# Patient Record
Sex: MALE | Race: BLACK OR AFRICAN AMERICAN | NOT HISPANIC OR LATINO | URBAN - METROPOLITAN AREA
[De-identification: names, ages, dates, MRNs, and addresses within clinical notes are randomized per-mention and may not be internally consistent; named-entity substitution may affect disease eponyms.]

---

## 2018-10-24 ENCOUNTER — INPATIENT (INPATIENT)
Facility: HOSPITAL | Age: 58
LOS: 6 days | Discharge: HOME | End: 2018-10-31
Attending: INTERNAL MEDICINE | Admitting: INTERNAL MEDICINE
Payer: COMMERCIAL

## 2018-10-24 VITALS
HEART RATE: 99 BPM | HEIGHT: 68 IN | WEIGHT: 158.95 LBS | OXYGEN SATURATION: 100 % | RESPIRATION RATE: 17 BRPM | TEMPERATURE: 98 F | SYSTOLIC BLOOD PRESSURE: 112 MMHG | DIASTOLIC BLOOD PRESSURE: 73 MMHG

## 2018-10-24 PROCEDURE — 99285 EMERGENCY DEPT VISIT HI MDM: CPT | Mod: 25

## 2018-10-24 NOTE — ED ADULT TRIAGE NOTE - CHIEF COMPLAINT QUOTE
pt fall yesterday c/o leg pain , bite her mouth, pt body shake denies drinking BIBA as per EMS ,pt fall yesterday c/o leg pain , bite her mouth, pt body shake denies drinking

## 2018-10-24 NOTE — ED ADULT NURSE NOTE - OBJECTIVE STATEMENT
Pt states, "I have been feeling weak, I cannot walk unless I have a cane.  I fell two times."  As per daughter, " My father has been off.  he doesn't even know what day it is sometimes  He has been unable to hold urine or bowel movement. This has been going on for a week and a half."

## 2018-10-25 DIAGNOSIS — R68.89 OTHER GENERAL SYMPTOMS AND SIGNS: ICD-10-CM

## 2018-10-25 DIAGNOSIS — R26.81 UNSTEADINESS ON FEET: ICD-10-CM

## 2018-10-25 LAB
ALBUMIN SERPL ELPH-MCNC: 3.4 G/DL — SIGNIFICANT CHANGE UP (ref 3.3–5)
ALP SERPL-CCNC: 58 U/L — SIGNIFICANT CHANGE UP (ref 40–120)
ALT FLD-CCNC: 50 U/L — SIGNIFICANT CHANGE UP (ref 12–78)
ANION GAP SERPL CALC-SCNC: 8 MMOL/L — SIGNIFICANT CHANGE UP (ref 5–17)
ANION GAP SERPL CALC-SCNC: 9 MMOL/L — SIGNIFICANT CHANGE UP (ref 5–17)
APPEARANCE UR: CLEAR — SIGNIFICANT CHANGE UP
APTT BLD: 31.3 SEC — SIGNIFICANT CHANGE UP (ref 27.5–37.4)
AST SERPL-CCNC: 32 U/L — SIGNIFICANT CHANGE UP (ref 15–37)
BASOPHILS # BLD AUTO: 0.03 K/UL — SIGNIFICANT CHANGE UP (ref 0–0.2)
BASOPHILS NFR BLD AUTO: 0.5 % — SIGNIFICANT CHANGE UP (ref 0–2)
BILIRUB SERPL-MCNC: 0.6 MG/DL — SIGNIFICANT CHANGE UP (ref 0.2–1.2)
BILIRUB UR-MCNC: NEGATIVE — SIGNIFICANT CHANGE UP
BUN SERPL-MCNC: 7 MG/DL — SIGNIFICANT CHANGE UP (ref 7–23)
BUN SERPL-MCNC: 8 MG/DL — SIGNIFICANT CHANGE UP (ref 7–23)
CALCIUM SERPL-MCNC: 8.8 MG/DL — SIGNIFICANT CHANGE UP (ref 8.5–10.1)
CALCIUM SERPL-MCNC: 8.9 MG/DL — SIGNIFICANT CHANGE UP (ref 8.5–10.1)
CHLORIDE SERPL-SCNC: 108 MMOL/L — SIGNIFICANT CHANGE UP (ref 96–108)
CHLORIDE SERPL-SCNC: 108 MMOL/L — SIGNIFICANT CHANGE UP (ref 96–108)
CHOLEST SERPL-MCNC: 144 MG/DL — SIGNIFICANT CHANGE UP (ref 10–199)
CK SERPL-CCNC: 120 U/L — SIGNIFICANT CHANGE UP (ref 26–308)
CO2 SERPL-SCNC: 25 MMOL/L — SIGNIFICANT CHANGE UP (ref 22–31)
CO2 SERPL-SCNC: 25 MMOL/L — SIGNIFICANT CHANGE UP (ref 22–31)
COLOR SPEC: YELLOW — SIGNIFICANT CHANGE UP
CREAT SERPL-MCNC: 0.88 MG/DL — SIGNIFICANT CHANGE UP (ref 0.5–1.3)
CREAT SERPL-MCNC: 0.9 MG/DL — SIGNIFICANT CHANGE UP (ref 0.5–1.3)
DIFF PNL FLD: NEGATIVE — SIGNIFICANT CHANGE UP
EOSINOPHIL # BLD AUTO: 0.1 K/UL — SIGNIFICANT CHANGE UP (ref 0–0.5)
EOSINOPHIL NFR BLD AUTO: 1.6 % — SIGNIFICANT CHANGE UP (ref 0–6)
ERYTHROCYTE [SEDIMENTATION RATE] IN BLOOD: 38 MM/HR — HIGH (ref 0–20)
GLUCOSE BLDC GLUCOMTR-MCNC: 99 MG/DL — SIGNIFICANT CHANGE UP (ref 70–99)
GLUCOSE SERPL-MCNC: 105 MG/DL — HIGH (ref 70–99)
GLUCOSE SERPL-MCNC: 90 MG/DL — SIGNIFICANT CHANGE UP (ref 70–99)
GLUCOSE UR QL: NEGATIVE MG/DL — SIGNIFICANT CHANGE UP
HAV IGM SER-ACNC: SIGNIFICANT CHANGE UP
HBA1C BLD-MCNC: 5.8 % — HIGH (ref 4–5.6)
HBV CORE IGM SER-ACNC: SIGNIFICANT CHANGE UP
HBV SURFACE AG SER-ACNC: SIGNIFICANT CHANGE UP
HCT VFR BLD CALC: 35.9 % — LOW (ref 39–50)
HCT VFR BLD CALC: 40.6 % — SIGNIFICANT CHANGE UP (ref 39–50)
HCV AB S/CO SERPL IA: 0.24 S/CO — SIGNIFICANT CHANGE UP
HCV AB SERPL-IMP: SIGNIFICANT CHANGE UP
HDLC SERPL-MCNC: 31 MG/DL — LOW
HGB BLD-MCNC: 12.8 G/DL — LOW (ref 13–17)
HGB BLD-MCNC: 14.2 G/DL — SIGNIFICANT CHANGE UP (ref 13–17)
IMM GRANULOCYTES NFR BLD AUTO: 0.6 % — SIGNIFICANT CHANGE UP (ref 0–1.5)
INR BLD: 1.09 RATIO — SIGNIFICANT CHANGE UP (ref 0.88–1.16)
KETONES UR-MCNC: NEGATIVE — SIGNIFICANT CHANGE UP
LACTATE SERPL-SCNC: 1.7 MMOL/L — SIGNIFICANT CHANGE UP (ref 0.7–2)
LEUKOCYTE ESTERASE UR-ACNC: NEGATIVE — SIGNIFICANT CHANGE UP
LIPID PNL WITH DIRECT LDL SERPL: 96 MG/DL — SIGNIFICANT CHANGE UP
LYMPHOCYTES # BLD AUTO: 3.69 K/UL — HIGH (ref 1–3.3)
LYMPHOCYTES # BLD AUTO: 59.6 % — HIGH (ref 13–44)
MCHC RBC-ENTMCNC: 29.6 PG — SIGNIFICANT CHANGE UP (ref 27–34)
MCHC RBC-ENTMCNC: 30.3 PG — SIGNIFICANT CHANGE UP (ref 27–34)
MCHC RBC-ENTMCNC: 35 GM/DL — SIGNIFICANT CHANGE UP (ref 32–36)
MCHC RBC-ENTMCNC: 35.7 GM/DL — SIGNIFICANT CHANGE UP (ref 32–36)
MCV RBC AUTO: 84.6 FL — SIGNIFICANT CHANGE UP (ref 80–100)
MCV RBC AUTO: 85.1 FL — SIGNIFICANT CHANGE UP (ref 80–100)
MONOCYTES # BLD AUTO: 0.57 K/UL — SIGNIFICANT CHANGE UP (ref 0–0.9)
MONOCYTES NFR BLD AUTO: 9.2 % — SIGNIFICANT CHANGE UP (ref 2–14)
NEUTROPHILS # BLD AUTO: 1.76 K/UL — LOW (ref 1.8–7.4)
NEUTROPHILS NFR BLD AUTO: 28.5 % — LOW (ref 43–77)
NITRITE UR-MCNC: NEGATIVE — SIGNIFICANT CHANGE UP
NRBC # BLD: 0 /100 WBCS — SIGNIFICANT CHANGE UP (ref 0–0)
NRBC # BLD: 0 /100 WBCS — SIGNIFICANT CHANGE UP (ref 0–0)
PH UR: 6 — SIGNIFICANT CHANGE UP (ref 5–8)
PLATELET # BLD AUTO: 69 K/UL — LOW (ref 150–400)
PLATELET # BLD AUTO: 75 K/UL — LOW (ref 150–400)
POTASSIUM SERPL-MCNC: 3.8 MMOL/L — SIGNIFICANT CHANGE UP (ref 3.5–5.3)
POTASSIUM SERPL-MCNC: 4.1 MMOL/L — SIGNIFICANT CHANGE UP (ref 3.5–5.3)
POTASSIUM SERPL-SCNC: 3.8 MMOL/L — SIGNIFICANT CHANGE UP (ref 3.5–5.3)
POTASSIUM SERPL-SCNC: 4.1 MMOL/L — SIGNIFICANT CHANGE UP (ref 3.5–5.3)
PROT SERPL-MCNC: 8.7 GM/DL — HIGH (ref 6–8.3)
PROT UR-MCNC: NEGATIVE MG/DL — SIGNIFICANT CHANGE UP
PROTHROM AB SERPL-ACNC: 11.9 SEC — SIGNIFICANT CHANGE UP (ref 9.8–12.7)
RBC # BLD: 4.22 M/UL — SIGNIFICANT CHANGE UP (ref 4.2–5.8)
RBC # BLD: 4.8 M/UL — SIGNIFICANT CHANGE UP (ref 4.2–5.8)
RBC # FLD: 14.1 % — SIGNIFICANT CHANGE UP (ref 10.3–14.5)
RBC # FLD: 14.2 % — SIGNIFICANT CHANGE UP (ref 10.3–14.5)
SODIUM SERPL-SCNC: 141 MMOL/L — SIGNIFICANT CHANGE UP (ref 135–145)
SODIUM SERPL-SCNC: 142 MMOL/L — SIGNIFICANT CHANGE UP (ref 135–145)
SP GR SPEC: 1.01 — SIGNIFICANT CHANGE UP (ref 1.01–1.02)
T PALLIDUM AB TITR SER: NEGATIVE — SIGNIFICANT CHANGE UP
TOTAL CHOLESTEROL/HDL RATIO MEASUREMENT: 4.6 RATIO — SIGNIFICANT CHANGE UP (ref 3.4–9.6)
TRIGL SERPL-MCNC: 87 MG/DL — SIGNIFICANT CHANGE UP (ref 10–149)
TROPONIN I SERPL-MCNC: <.015 NG/ML — SIGNIFICANT CHANGE UP (ref 0.01–0.04)
TSH SERPL-MCNC: 0.66 UIU/ML — SIGNIFICANT CHANGE UP (ref 0.36–3.74)
UROBILINOGEN FLD QL: NEGATIVE MG/DL — SIGNIFICANT CHANGE UP
VIT B12 SERPL-MCNC: 828 PG/ML — SIGNIFICANT CHANGE UP (ref 232–1245)
WBC # BLD: 5.47 K/UL — SIGNIFICANT CHANGE UP (ref 3.8–10.5)
WBC # BLD: 6.19 K/UL — SIGNIFICANT CHANGE UP (ref 3.8–10.5)
WBC # FLD AUTO: 5.47 K/UL — SIGNIFICANT CHANGE UP (ref 3.8–10.5)
WBC # FLD AUTO: 6.19 K/UL — SIGNIFICANT CHANGE UP (ref 3.8–10.5)

## 2018-10-25 PROCEDURE — 99223 1ST HOSP IP/OBS HIGH 75: CPT

## 2018-10-25 PROCEDURE — 70450 CT HEAD/BRAIN W/O DYE: CPT | Mod: 26

## 2018-10-25 PROCEDURE — 70551 MRI BRAIN STEM W/O DYE: CPT | Mod: 26

## 2018-10-25 PROCEDURE — 72148 MRI LUMBAR SPINE W/O DYE: CPT | Mod: 26

## 2018-10-25 PROCEDURE — 71046 X-RAY EXAM CHEST 2 VIEWS: CPT | Mod: 26

## 2018-10-25 PROCEDURE — 93010 ELECTROCARDIOGRAM REPORT: CPT

## 2018-10-25 PROCEDURE — 12345: CPT | Mod: NC

## 2018-10-25 PROCEDURE — 72141 MRI NECK SPINE W/O DYE: CPT | Mod: 26

## 2018-10-25 RX ORDER — INFLUENZA VIRUS VACCINE 15; 15; 15; 15 UG/.5ML; UG/.5ML; UG/.5ML; UG/.5ML
0.5 SUSPENSION INTRAMUSCULAR ONCE
Qty: 0 | Refills: 0 | Status: DISCONTINUED | OUTPATIENT
Start: 2018-10-25 | End: 2018-10-31

## 2018-10-25 RX ORDER — SODIUM CHLORIDE 9 MG/ML
1000 INJECTION INTRAMUSCULAR; INTRAVENOUS; SUBCUTANEOUS ONCE
Qty: 0 | Refills: 0 | Status: COMPLETED | OUTPATIENT
Start: 2018-10-25 | End: 2018-10-25

## 2018-10-25 RX ADMIN — SODIUM CHLORIDE 1000 MILLILITER(S): 9 INJECTION INTRAMUSCULAR; INTRAVENOUS; SUBCUTANEOUS at 02:15

## 2018-10-25 NOTE — H&P ADULT - HISTORY OF PRESENT ILLNESS
59 y/o male no pmh BIB daughter for intermittent confusion and unsteady gait since falling 3 weeks ago. Pt states he tends to fall to right side; he denies muscle weakness,  but does complain of numbness of RLE. He denies headache, neck pain, dizziness or vertigo,  change of urinary or bowel habits, abd pain, N/V, rectal bleeding, melena, rash.  No fever/chills, No photophobia/eye pain/changes in vision, No ear pain/sore throat/dysphagia, No chest pain/palpitations, no SOB/cough/wheeze/stridor, No abdominal pain, No N/V/D, no dysuria/frequency/discharge, No neck/back pain. He takes no medications and drinks 1-2 beers/week. He is poor historian.

## 2018-10-25 NOTE — ED PROVIDER NOTE - MEDICAL DECISION MAKING DETAILS
labs and imaging reviewed. patient received ivfs. he is now admitted to medicine for further management.

## 2018-10-25 NOTE — H&P ADULT - NSHPPHYSICALEXAM_GEN_ALL_CORE
T(C): 36.7 (24 Oct 2018 22:52), Max: 36.7 (24 Oct 2018 22:52)  T(F): 98 (24 Oct 2018 22:52), Max: 98 (24 Oct 2018 22:52)  HR: 99 (24 Oct 2018 22:52) (99 - 99)  BP: 112/73 (24 Oct 2018 22:52) (112/73 - 112/73)  BP(mean): --  RR: 17 (24 Oct 2018 22:52) (17 - 17)  SpO2: 100% (24 Oct 2018 22:52) (100% - 100%)    PHYSICAL EXAM:  GENERAL: NAD, well-groomed, well-developed  HEAD:  Atraumatic, Normocephalic  EYES: EOMI, PERRLA, conjunctiva and sclera clear  ENMT: No tonsillar erythema, exudates, or enlargement; Moist mucous membranes, No lesions  NECK: Supple, No JVD, Normal thyroid  NERVOUS SYSTEM:  Alert & Oriented X2, CN 2-12 intact; Motor Strength 5/5 B/L upper and lower extremities; decreased sensation light touch RLE, bilateral coarse tremor, F-nose intact   CHEST/LUNG: Clear to percussion bilaterally; No rales, rhonchi, wheezing, or rubs  HEART: Regular rate and rhythm; No murmurs, rubs, or gallops  ABDOMEN: Soft, Nontender, Nondistended; Bowel sounds present  EXTREMITIES: + Peripheral Pulses, No clubbing, cyanosis, or edema  LYMPH: No lymphadenopathy noted  SKIN: Warm and dry; No rashes or lesions

## 2018-10-25 NOTE — CHART NOTE - NSCHARTNOTEFT_GEN_A_CORE
Patient is a 58y old  Male who presents with a chief complaint of Unsteady gait and falls. (25 Oct 2018 06:17)        HPI:  59 y/o male no pmh BIB daughter for intermittent confusion and unsteady gait since falling 3 weeks ago. Pt states he tends to fall to right side; he denies muscle weakness,  but does complain of numbness of RLE. He denies headache, neck pain, dizziness or vertigo,  change of urinary or bowel habits, abd pain, N/V, rectal bleeding, melena, rash.  No fever/chills, No photophobia/eye pain/changes in vision, No ear pain/sore throat/dysphagia, No chest pain/palpitations, no SOB/cough/wheeze/stridor, No abdominal pain, No N/V/D, no dysuria/frequency/discharge, No neck/back pain. He takes no medications and drinks 1-2 beers/week. He is poor historian. (25 Oct 2018 06:17)      SUBJECTIVE & OBJECTIVE: Pt seen and examined at bedside. States that he feels weak and tired, decrease sensation to the upper and lower right extremity     PHYSICAL EXAM:  T(C): 36.7 (10-25-18 @ 12:33), Max: 36.9 (10-25-18 @ 10:41)  HR: 80 (10-25-18 @ 12:33) (72 - 99)  BP: 121/73 (10-25-18 @ 12:33) (112/73 - 134/86)  RR: 18 (10-25-18 @ 12:33) (16 - 18)  SpO2: 99% (10-25-18 @ 12:33) (97% - 100%)  Wt(kg): -- Height (cm): 172.72 (10-24 @ 22:52)  Weight (kg): 72.1 (10-24 @ 22:52)  BMI (kg/m2): 24.2 (10-24 @ 22:52)  BSA (m2): 1.85 (10-24 @ 22:52)  I&O's Detail    GENERAL: NAD, well-groomed, well-developed  HEAD:  Atraumatic, Normocephalic  EYES: EOMI, PERRLA, conjunctiva and sclera clear  ENMT: Moist mucous membranes  NECK: Supple, No JVD  NERVOUS SYSTEM:  right upper extremity tremor, decreased sensation to right upper and lower extremity  CHEST/LUNG: Clear to auscultation bilaterally; No rales, rhonchi, wheezing, or rubs  HEART: Regular rate and rhythm; No murmurs, rubs, or gallops  ABDOMEN: Soft, Nontender, Nondistended; Bowel sounds present  EXTREMITIES:  2+ Peripheral Pulses, No clubbing, cyanosis, or edema    MEDICATIONS  (STANDING):  influenza   Vaccine 0.5 milliLiter(s) IntraMuscular once    MEDICATIONS  (PRN):      LABS:                        14.2   6.19  )-----------( 75       ( 25 Oct 2018 13:42 )             40.6     10-25    142  |  108  |  7   ----------------------------<  90  3.8   |  25  |  0.88    Ca    8.8      25 Oct 2018 07:05    TPro  8.7<H>  /  Alb  3.4  /  TBili  0.6  /  DBili  x   /  AST  32  /  ALT  50  /  AlkPhos  58  10    PT/INR - ( 25 Oct 2018 04:33 )   PT: 11.9 sec;   INR: 1.09 ratio         PTT - ( 25 Oct 2018 04:33 )  PTT:31.3 sec  Urinalysis Basic - ( 25 Oct 2018 05:01 )    Color: Yellow / Appearance: Clear / S.010 / pH: x  Gluc: x / Ketone: Negative  / Bili: Negative / Urobili: Negative mg/dL   Blood: x / Protein: Negative mg/dL / Nitrite: Negative   Leuk Esterase: Negative / RBC: x / WBC x   Sq Epi: x / Non Sq Epi: x / Bacteria: x      CAPILLARY BLOOD GLUCOSE  POCT Blood Glucose.: 99 mg/dL (25 Oct 2018 04:57)      CARDIAC MARKERS ( 25 Oct 2018 04:33 )  <.015 ng/mL / x     / 120 U/L / x     / x        RADIOLOGY & ADDITIONAL TESTS:  < from: MR Head No Cont (10.25.18 @ 10:05) >    1)  chronic ischemic changes within the basal ganglia and white matter of   both hemispheres. No acute infarct or hemorrhage..  2)  extensive pansinus disease with findings suggesting chronic sinusitis   and sinonasal polyposis..     < end of copied text >   from: MR Lumbar Spine No Cont (10.25.18 @ 10:05) >    1. Mild degenerative changes and bulging discs L3-S1.  2. No significant stenosis or intradural abnormality.  3. No acute fracture or destructive lesion.    < end of copied text >    DVT/GI ppx  Discussed with pt @ bedside    59 y/o male with unsteady gait and forgetfulness  Problem/Plan - 1:  ·  Problem: Unsteady gait.    Plan: MR essential negative for acute infarct ,   following B12, rpr,   pending neurology consult.     Problem/Plan - 2:  ·  Problem: Forgetfulness.    Plan: MR, B12, rpr, ? early dementia.

## 2018-10-25 NOTE — H&P ADULT - ASSESSMENT
57 y/o male no pmh BIB daughter for intermittent confusion and unsteady gait since falling 3 weeks ago. Pt has thrombocytopenia, CT head shows no SDH, no evidence hydrocephalus, doubt NPH. Will check RPR and B12, he has tremor, but no increased muscle tonus, and Parkinson's would not progress this quickly. His MCV is not elevated, and LFT's are normal range.  IMPROVE VTE Individual Risk Assessment          RISK                                                          Points    [  ] Previous VTE                                                3    [  ] Thrombophilia                                             2    [  ] Lower limb paralysis                                    2        (unable to hold up >15 seconds)      [  ] Current Cancer                                             2         (within 6 months)    [  ] Immobilization > 24 hrs                              1    [  ] ICU/CCU stay > 24 hours                            1    [  ] Age > 60                                                    1    IMPROVE VTE Score _____0____

## 2018-10-25 NOTE — H&P ADULT - NSHPLABSRESULTS_GEN_ALL_CORE
LABS:                        12.8   5.47  )-----------( 69       ( 25 Oct 2018 04:33 )             35.9     10-25    141  |  108  |  8   ----------------------------<  105<H>  4.1   |  25  |  0.90    Ca    8.9      25 Oct 2018 04:33    TPro  8.7<H>  /  Alb  3.4  /  TBili  0.6  /  DBili  x   /  AST  32  /  ALT  50  /  AlkPhos  58  10-    PT/INR - ( 25 Oct 2018 04:33 )   PT: 11.9 sec;   INR: 1.09 ratio         PTT - ( 25 Oct 2018 04:33 )  PTT:31.3 sec  Troponin I, Serum: <.015:  ng/mL (10.25.18 @ 04:33)  Thyroid Stimulating Hormone, Serum: 0.658 uIU/mL (10.25.18 @ 04:33)      Urinalysis Basic - ( 25 Oct 2018 05:01 )    Color: Yellow / Appearance: Clear / S.010 / pH: x  Gluc: x / Ketone: Negative  / Bili: Negative / Urobili: Negative mg/dL   Blood: x / Protein: Negative mg/dL / Nitrite: Negative   Leuk Esterase: Negative / RBC: x / WBC x   Sq Epi: x / Non Sq Epi: x / Bacteria: x      CAPILLARY BLOOD GLUCOSE      POCT Blood Glucose.: 99 mg/dL (25 Oct 2018 04:57)        RADIOLOGY & ADDITIONAL TESTS:  < from: CT Head No Cont (10.25.18 @ 01:08) >    IMPRESSION:  No acute intracranial hemorrhage or mass effect. Mild chronic   microvascular changes.    CXR: no infiltrate    Imaging Personally Reviewed:  [x] YES  [ ] NO  EKG: sinus@ 86 no acute ischemic changes

## 2018-10-25 NOTE — H&P ADULT - NSHPREVIEWOFSYSTEMS_GEN_ALL_CORE
REVIEW OF SYSTEMS:  CONSTITUTIONAL: No fever, weight loss, or fatigue  EYES: No eye pain, visual disturbances, or discharge  ENMT:  No difficulty hearing, tinnitus, vertigo; No sinus or throat pain  NECK: No pain or stiffness  RESPIRATORY: No cough, wheezing, chills or hemoptysis; No shortness of breath  CARDIOVASCULAR: No chest pain, palpitations, dizziness, or leg swelling  GASTROINTESTINAL: No abdominal or epigastric pain. No nausea, vomiting, or hematemesis; No diarrhea or constipation. No melena or hematochezia.  GENITOURINARY: No dysuria, frequency, hematuria, or incontinence  NEUROLOGICAL: No headaches, memory loss, loss of strength, +numbness, + tremors  SKIN: No itching, burning, rashes, or lesions   LYMPH NODES: No enlarged glands  ENDOCRINE: No heat or cold intolerance; No hair loss  MUSCULOSKELETAL: No joint pain or swelling; No muscle, back, or extremity pain  PSYCHIATRIC: No depression, anxiety, mood swings, or difficulty sleeping  HEME/LYMPH: No easy bruising, or bleeding gums  ALLERGY AND IMMUNOLOGIC: No hives or eczema

## 2018-10-26 DIAGNOSIS — R29.898 OTHER SYMPTOMS AND SIGNS INVOLVING THE MUSCULOSKELETAL SYSTEM: ICD-10-CM

## 2018-10-26 DIAGNOSIS — R25.9 UNSPECIFIED ABNORMAL INVOLUNTARY MOVEMENTS: ICD-10-CM

## 2018-10-26 LAB
ALBUMIN SERPL ELPH-MCNC: 3.4 G/DL — SIGNIFICANT CHANGE UP (ref 3.3–5)
ALP SERPL-CCNC: 53 U/L — SIGNIFICANT CHANGE UP (ref 40–120)
ALT FLD-CCNC: 48 U/L — SIGNIFICANT CHANGE UP (ref 12–78)
ANION GAP SERPL CALC-SCNC: 8 MMOL/L — SIGNIFICANT CHANGE UP (ref 5–17)
AST SERPL-CCNC: 34 U/L — SIGNIFICANT CHANGE UP (ref 15–37)
BASOPHILS # BLD AUTO: 0.03 K/UL — SIGNIFICANT CHANGE UP (ref 0–0.2)
BASOPHILS NFR BLD AUTO: 0.6 % — SIGNIFICANT CHANGE UP (ref 0–2)
BILIRUB SERPL-MCNC: 0.9 MG/DL — SIGNIFICANT CHANGE UP (ref 0.2–1.2)
BUN SERPL-MCNC: 10 MG/DL — SIGNIFICANT CHANGE UP (ref 7–23)
CALCIUM SERPL-MCNC: 9.3 MG/DL — SIGNIFICANT CHANGE UP (ref 8.5–10.1)
CHLORIDE SERPL-SCNC: 107 MMOL/L — SIGNIFICANT CHANGE UP (ref 96–108)
CO2 SERPL-SCNC: 26 MMOL/L — SIGNIFICANT CHANGE UP (ref 22–31)
CREAT SERPL-MCNC: 1.01 MG/DL — SIGNIFICANT CHANGE UP (ref 0.5–1.3)
CULTURE RESULTS: SIGNIFICANT CHANGE UP
EOSINOPHIL # BLD AUTO: 0.11 K/UL — SIGNIFICANT CHANGE UP (ref 0–0.5)
EOSINOPHIL NFR BLD AUTO: 2.1 % — SIGNIFICANT CHANGE UP (ref 0–6)
GLUCOSE SERPL-MCNC: 87 MG/DL — SIGNIFICANT CHANGE UP (ref 70–99)
HCT VFR BLD CALC: 37.1 % — LOW (ref 39–50)
HGB BLD-MCNC: 13.2 G/DL — SIGNIFICANT CHANGE UP (ref 13–17)
IMM GRANULOCYTES NFR BLD AUTO: 0.6 % — SIGNIFICANT CHANGE UP (ref 0–1.5)
LYMPHOCYTES # BLD AUTO: 3.34 K/UL — HIGH (ref 1–3.3)
LYMPHOCYTES # BLD AUTO: 62.3 % — HIGH (ref 13–44)
MCHC RBC-ENTMCNC: 29.9 PG — SIGNIFICANT CHANGE UP (ref 27–34)
MCHC RBC-ENTMCNC: 35.6 GM/DL — SIGNIFICANT CHANGE UP (ref 32–36)
MCV RBC AUTO: 84.1 FL — SIGNIFICANT CHANGE UP (ref 80–100)
MONOCYTES # BLD AUTO: 0.62 K/UL — SIGNIFICANT CHANGE UP (ref 0–0.9)
MONOCYTES NFR BLD AUTO: 11.6 % — SIGNIFICANT CHANGE UP (ref 2–14)
NEUTROPHILS # BLD AUTO: 1.23 K/UL — LOW (ref 1.8–7.4)
NEUTROPHILS NFR BLD AUTO: 22.8 % — LOW (ref 43–77)
PLATELET # BLD AUTO: 73 K/UL — LOW (ref 150–400)
POTASSIUM SERPL-MCNC: 3.7 MMOL/L — SIGNIFICANT CHANGE UP (ref 3.5–5.3)
POTASSIUM SERPL-SCNC: 3.7 MMOL/L — SIGNIFICANT CHANGE UP (ref 3.5–5.3)
PROT SERPL-MCNC: 9 GM/DL — HIGH (ref 6–8.3)
RBC # BLD: 4.41 M/UL — SIGNIFICANT CHANGE UP (ref 4.2–5.8)
RBC # FLD: 13.9 % — SIGNIFICANT CHANGE UP (ref 10.3–14.5)
SODIUM SERPL-SCNC: 141 MMOL/L — SIGNIFICANT CHANGE UP (ref 135–145)
SPECIMEN SOURCE: SIGNIFICANT CHANGE UP
WBC # BLD: 5.36 K/UL — SIGNIFICANT CHANGE UP (ref 3.8–10.5)
WBC # FLD AUTO: 5.36 K/UL — SIGNIFICANT CHANGE UP (ref 3.8–10.5)

## 2018-10-26 PROCEDURE — 99233 SBSQ HOSP IP/OBS HIGH 50: CPT

## 2018-10-26 PROCEDURE — 93306 TTE W/DOPPLER COMPLETE: CPT | Mod: 26

## 2018-10-26 RX ORDER — LORATADINE 10 MG/1
10 TABLET ORAL DAILY
Qty: 0 | Refills: 0 | Status: DISCONTINUED | OUTPATIENT
Start: 2018-10-26 | End: 2018-10-31

## 2018-10-26 RX ORDER — ASPIRIN/CALCIUM CARB/MAGNESIUM 324 MG
81 TABLET ORAL DAILY
Qty: 0 | Refills: 0 | Status: DISCONTINUED | OUTPATIENT
Start: 2018-10-26 | End: 2018-10-31

## 2018-10-26 RX ADMIN — Medication 81 MILLIGRAM(S): at 12:40

## 2018-10-26 RX ADMIN — LORATADINE 10 MILLIGRAM(S): 10 TABLET ORAL at 15:31

## 2018-10-26 NOTE — PROGRESS NOTE ADULT - PROBLEM SELECTOR PLAN 1
- MR head demonstrates chronic ischemic changes  - ? Cardiac history   - TSH wnl, B12 wnl, following lyme titers  - Neurology consult pending  - would obtain TTE  - Patient would benefit from ASA 81 mg daily, will start

## 2018-10-26 NOTE — PHYSICAL THERAPY INITIAL EVALUATION ADULT - ADDITIONAL COMMENTS
Pt lives in an apartment. Pt is on 1st floor with no stairs. Pt ambulates with standard cane. Pt does not work and is on disability

## 2018-10-26 NOTE — PROGRESS NOTE ADULT - SUBJECTIVE AND OBJECTIVE BOX
Patient is a 58y old  Male who presents with a chief complaint of Unsteady gait and falls. (25 Oct 2018 06:17)        HPI:  59 y/o male no pmh BIB daughter for intermittent confusion and unsteady gait since falling 3 weeks ago. Pt states he tends to fall to right side; he denies muscle weakness,  but does complain of numbness of RLE. He denies headache, neck pain, dizziness or vertigo,  change of urinary or bowel habits, abd pain, N/V, rectal bleeding, melena, rash.  No fever/chills, No photophobia/eye pain/changes in vision, No ear pain/sore throat/dysphagia, No chest pain/palpitations, no SOB/cough/wheeze/stridor, No abdominal pain, No N/V/D, no dysuria/frequency/discharge, No neck/back pain. He takes no medications and drinks 1-2 beers/week. He is poor historian. (25 Oct 2018 06:17)      SUBJECTIVE & OBJECTIVE: Pt seen and examined at bedside. States that he continues to feel weak but that his appetite is better.  Denies SOB, chest pain, headache, or other symptom.     PHYSICAL EXAM:  T(C): 36.9 (10-26-18 @ 05:15), Max: 36.9 (10-25-18 @ 10:41)  HR: 96 (10-26-18 @ 05:15) (72 - 96)  BP: 113/72 (10-26-18 @ 05:15) (108/70 - 123/75)  RR: 16 (10-26-18 @ 05:15) (16 - 18)  SpO2: 98% (10-26-18 @ 05:15) (98% - 99%)  Wt(kg): --   I&O's Detail    GENERAL: NAD, well-groomed, well-developed  HEAD:  Atraumatic, Normocephalic  EYES: EOMI, PERRLA, conjunctiva and sclera clear  ENMT: Moist mucous membranes  NECK: Supple, No JVD  NERVOUS SYSTEM:  Alert & Oriented X3, Motor Strength 5/5 B/L upper and lower extremities; DTRs 2+ intact and symmetric  CHEST/LUNG: Clear to auscultation bilaterally; No rales, rhonchi, wheezing, or rubs  HEART: Regular rate and rhythm; No murmurs, rubs, or gallops  ABDOMEN: Soft, Nontender, Nondistended; Bowel sounds present  EXTREMITIES:  2+ Peripheral Pulses, No clubbing, cyanosis, or edema    MEDICATIONS  (STANDING):  influenza   Vaccine 0.5 milliLiter(s) IntraMuscular once  loratadine 10 milliGRAM(s) Oral daily    MEDICATIONS  (PRN):      LABS:                        13.2   5.36  )-----------( 73       ( 26 Oct 2018 07:44 )             37.1     10-26    141  |  107  |  10  ----------------------------<  87  3.7   |  26  |  1.01    Ca    9.3      26 Oct 2018 07:44  TPro  9.0<H>  /  Alb  3.4  /  TBili  0.9  /  DBili  x   /  AST  34  /  ALT  48  /  AlkPhos  53  10-26  PT/INR - ( 25 Oct 2018 04:33 )   PT: 11.9 sec;   INR: 1.09 ratio    PTT - ( 25 Oct 2018 04:33 )  PTT:31.3 sec  Urinalysis Basic - ( 25 Oct 2018 05:01 )  Color: Yellow / Appearance: Clear / S.010 / pH: x  Gluc: x / Ketone: Negative  / Bili: Negative / Urobili: Negative mg/dL   Blood: x / Protein: Negative mg/dL / Nitrite: Negative   Leuk Esterase: Negative / RBC: x / WBC x   Sq Epi: x / Non Sq Epi: x / Bacteria: x    CARDIAC MARKERS ( 25 Oct 2018 04:33 )  <.015 ng/mL / x     / 120 U/L / x     / x      RADIOLOGY & ADDITIONAL TESTS:  < from: MR Lumbar Spine No Cont (10.25.18 @ 10:05) >  1. Mild degenerative changes and bulging discs L3-S1.  2. No significant stenosis or intradural abnormality.  3. No acute fracture or destructive lesion.  < end of copied text >  < from: MR Head No Cont (10.25.18 @ 10:05) >  IMPRESSION:      1)  chronic ischemic changes within the basal ganglia and white matter of   both hemispheres. No acute infarct or hemorrhage..  2)  extensive pansinus disease with findings suggesting chronic sinusitis   and sinonasal polyposis..     < end of copied text >    DVT/GI ppx  Discussed with pt @ bedside

## 2018-10-26 NOTE — OCCUPATIONAL THERAPY INITIAL EVALUATION ADULT - PLANNED THERAPY INTERVENTIONS, OT EVAL
ADL retraining/IADL retraining/fine motor coordination training/motor coordination training/transfer training/bed mobility training/parent/caregiver training.../strengthening/stretching/balance training

## 2018-10-26 NOTE — OCCUPATIONAL THERAPY INITIAL EVALUATION ADULT - ADDITIONAL COMMENTS
Patient reports that he lives with spouse and daughter Yoshi in 1st floor apartment with no steps to enter and no steps to negotiate inside the building. Patient reports that he performs functional mobility with cane with supervision from family, requires assistance from family for lower body dressing, assistance from family for tub transfers with shower chair, assistance from family for bathing & assistance for meal preparation. Patient's daughter Yoshi Roberts was present for evaluation and confirmed all information about patient's social history and prior level of function.

## 2018-10-26 NOTE — OCCUPATIONAL THERAPY INITIAL EVALUATION ADULT - LEVEL OF INDEPENDENCE: DRESS LOWER BODY, OT EVAL
socks: right LE supervision, left LE moderate assistance/supervision/moderate assist (50% patients effort)

## 2018-10-27 PROCEDURE — 99233 SBSQ HOSP IP/OBS HIGH 50: CPT

## 2018-10-27 RX ADMIN — LORATADINE 10 MILLIGRAM(S): 10 TABLET ORAL at 14:48

## 2018-10-27 RX ADMIN — Medication 81 MILLIGRAM(S): at 12:52

## 2018-10-27 NOTE — PROGRESS NOTE ADULT - ASSESSMENT
57 y/o male with weakness, gait instability, and tremor    Problem/Plan - 1:  ·  Problem: R/O Vascular dementia without behavioral disturbance.  Plan: - MR head demonstrates chronic ischemic changes  - ? Cardiac history   - TSH wnl, B12 wnl, following lyme titers  - TTE wnl  - Patient would benefit from ASA 81 mg daily, will start.   - pending Neurology eval    Problem/Plan - 2:  ·  Problem: Weakness of both lower extremities.  Plan: - patient would benefit from PT and likely [facility]  - pending PT evaluation.     Problem/Plan - 3:  ·  Problem: Resting tremor.  Plan: - OT evaluation  - Neurology eval.  -.

## 2018-10-27 NOTE — PROGRESS NOTE ADULT - SUBJECTIVE AND OBJECTIVE BOX
Patient is a 58y old  Male who presents with a chief complaint of Unsteady gait and falls. (26 Oct 2018 10:09)        HPI:  57 y/o male no pmh BIB daughter for intermittent confusion and unsteady gait since falling 3 weeks ago. Pt states he tends to fall to right side; he denies muscle weakness,  but does complain of numbness of RLE. He denies headache, neck pain, dizziness or vertigo,  change of urinary or bowel habits, abd pain, N/V, rectal bleeding, melena, rash.  No fever/chills, No photophobia/eye pain/changes in vision, No ear pain/sore throat/dysphagia, No chest pain/palpitations, no SOB/cough/wheeze/stridor, No abdominal pain, No N/V/D, no dysuria/frequency/discharge, No neck/back pain. He takes no medications and drinks 1-2 beers/week. He is poor historian. (25 Oct 2018 06:17)      SUBJECTIVE & OBJECTIVE: Pt seen and examined at bedside.     PHYSICAL EXAM:  T(C): 36.7 (10-27-18 @ 17:23), Max: 36.9 (10-27-18 @ 05:33)  HR: 81 (10-27-18 @ 17:23) (80 - 89)  BP: 115/73 (10-27-18 @ 17:23) (95/61 - 115/73)  RR: 18 (10-27-18 @ 17:23) (16 - 18)  SpO2: 97% (10-27-18 @ 17:23) (97% - 98%)  Wt(kg): --   I&O's Detail    GENERAL: NAD, well-groomed, well-developed  HEAD:  Atraumatic, Normocephalic  EYES: EOMI, PERRLA, conjunctiva and sclera clear  ENMT: Moist mucous membranes  NECK: Supple, No JVD  NERVOUS SYSTEM:  Alert & Oriented X3, Motor Strength 5/5 B/L upper and lower extremities; DTRs 2+ intact and symmetric  CHEST/LUNG: Clear to auscultation bilaterally; No rales, rhonchi, wheezing, or rubs  HEART: Regular rate and rhythm; No murmurs, rubs, or gallops  ABDOMEN: Soft, Nontender, Nondistended; Bowel sounds present  EXTREMITIES:  2+ Peripheral Pulses, No clubbing, cyanosis, or edema    MEDICATIONS  (STANDING):  aspirin enteric coated 81 milliGRAM(s) Oral daily  influenza   Vaccine 0.5 milliLiter(s) IntraMuscular once  loratadine 10 milliGRAM(s) Oral daily    MEDICATIONS  (PRN):      LABS:                        13.2   5.36  )-----------( 73       ( 26 Oct 2018 07:44 )             37.1     10-26    141  |  107  |  10  ----------------------------<  87  3.7   |  26  |  1.01    Ca    9.3      26 Oct 2018 07:44    TPro  9.0<H>  /  Alb  3.4  /  TBili  0.9  /  DBili  x   /  AST  34  /  ALT  48  /  AlkPhos  53  10-26    RECENT CULTURES:  Urine culture:  10-25 @ 12:12 --   No growth to date.  Urine culture:  10-25 @ 11:56 --   <10,000 CFU/ml Normal Urogenital komal present      RADIOLOGY & ADDITIONAL TESTS:      DVT/GI ppx  Discussed with pt @ bedside

## 2018-10-28 PROCEDURE — 99233 SBSQ HOSP IP/OBS HIGH 50: CPT

## 2018-10-28 RX ADMIN — Medication 81 MILLIGRAM(S): at 12:09

## 2018-10-28 RX ADMIN — LORATADINE 10 MILLIGRAM(S): 10 TABLET ORAL at 12:09

## 2018-10-28 NOTE — CONSULT NOTE ADULT - SUBJECTIVE AND OBJECTIVE BOX
Subjective Complaints:  Historian:       Consult requested by ER doctor:                  Attending:     HPI:  57 y/o male no pmh BIB daughter for intermittent confusion and unsteady gait since falling 3 weeks ago. Pt states he tends to fall to right side; he denies muscle weakness,  but does complain of numbness of RLE. He denies headache, neck pain, dizziness or vertigo,  change of urinary or bowel habits, abd pain, N/V, rectal bleeding, melena, rash.  No fever/chills, No photophobia/eye pain/changes in vision, No ear pain/sore throat/dysphagia, No chest pain/palpitations, no SOB/cough/wheeze/stridor, No abdominal pain, No N/V/D, no dysuria/frequency/discharge, No neck/back pain. He takes no medications and drinks 1-2 beers/week. He is poor historian. (25 Oct 2018 06:17)    KEN LEE    PAST MEDICAL & SURGICAL HISTORY:  No pertinent past medical history  No significant past surgical history  58yMale    MEDICATIONS  (STANDING):  aspirin enteric coated 81 milliGRAM(s) Oral daily  influenza   Vaccine 0.5 milliLiter(s) IntraMuscular once  loratadine 10 milliGRAM(s) Oral daily    MEDICATIONS  (PRN):      Allergies    No Known Allergies    Intolerances      FAMILY HISTORY:  No pertinent family history in first degree relatives      REVIEW OF SYSTEMS:  General:  No wt loss, fevers, chills, night sweats  Eyes:  Good vision, no reported pain  ENT:  No sore throat, pain, runny nose, dysphagia  CV:  No pain, palpitatioins, hypo/hypertension  Resp:  No dyspnea, cough, tachypnea, wheezing  GI:  No pain, nausea, vomiting, diarrhea, constipatiion  :  No pain, bleeding, incontinence, nocturia  Muscle:  No pain, weakness  Breast:  No pain, abscess, mass, discharge  Neuro:  No weakness, tingling, memory problems  Psych:  No fatigue, insomnia, mood problems, depression  Endocrine:  No polyuria, polydypsia, cold/heat intolerance  Heme:  No petechiae, ecchymosis, easy bruisability  Skin:  No rash, tattoos, scars, edema      Vital Signs Last 24 Hrs  T(C): 36.7 (28 Oct 2018 12:40), Max: 36.9 (28 Oct 2018 05:11)  T(F): 98 (28 Oct 2018 12:40), Max: 98.4 (28 Oct 2018 05:11)  HR: 92 (28 Oct 2018 12:40) (81 - 92)  BP: 102/60 (28 Oct 2018 12:40) (102/60 - 131/82)  BP(mean): --  RR: 17 (28 Oct 2018 12:40) (17 - 18)  SpO2: 98% (28 Oct 2018 12:40) (97% - 100%)    GENERAL PHYSICAL EXAM:  General:  Appears stated age, well-groomed, well-nourished, no distress  HEENT:  NC/AT, patent nares w/ pink mucosa, OP clear w/o lesions, PERRL, EOMI, conjunctivae clear, no thyromegaly, nodules, adenopathy, no JVD  Chest:  Full & symmetric excursion, no increased effort, breath sounds clear  Cardiovascular:  Regular rhythm, S1, S2, no murmur/rub/S3/S4, no carotid/femoral/abdominal bruit, radial/pedal pulses 2+, no edema  Abdomen:  Soft, non-tender, non-distended, normoactive bowel sounds, no HSM  Extremities:  Gait & station:   Digits:   Nails:   Joints, Bones, Muscles:   ROM:   Stability:  Skin:  No rash/erythema/ecchymoses/petechiae/wounds/abscess/warm/dry  Musculoskeletal:  Full ROM in all joints w/o swelling/tenderness/effusion    NEUROLOGICAL EXAM:  HENT:  Normocephalic head; atraumatic head.  Neck supple.  ENT: normal looking.  Mental State:    Alert.  Fully oriented to person, place and date. ATTENTION SPAN IS SHORT  Speech clear and intact.  Cooperative.  Responds appropriately.    Cranial Nerves:  II-XII:   Pupils round and reactive to light and accommodation.  Extraocular movements full.  Visual fields full (no homonymous hemianopsia).  Visual acuity wnl.  Facial symmetry intact.  Tongue midline.  Motor Functions:  MoTOR STRENGTH IS 4/5    Sensory Functions: UNRELIABLE   Reflexes:  Deep tendon reflexes normoactive to biceps, knees and ankles. PLANTAR RESPONSES ARE FLEXOR   Cerebellar Testing:    Finger to nose intact.  Nystagmus absent.  gAIT :HESITANT     LABS: [facility]                   RADIOLOGY & ADDITIONAL STUDIES:        Assessment & Opinion: 58 Y O MAN SEEN FOR EVALUATION BECAUSE OF CONFUSION IS FOUND TO HAVE SYMPTOMS AND SIGNS OF ENCEPHALOPATHY   DX CHANGE IN MENTAL STATUS   PLAN ENCEPHALOPATHY W/O MISC FROM THE [facility]     Recommendations:     EEG.   DVT prophylaxis as ordered.  Medications:

## 2018-10-29 LAB
B BURGDOR C6 AB SER-ACNC: NEGATIVE — SIGNIFICANT CHANGE UP
B BURGDOR IGG+IGM SER-ACNC: 0.53 INDEX — SIGNIFICANT CHANGE UP (ref 0.01–0.89)

## 2018-10-29 PROCEDURE — 99232 SBSQ HOSP IP/OBS MODERATE 35: CPT

## 2018-10-29 RX ADMIN — Medication 81 MILLIGRAM(S): at 12:01

## 2018-10-29 RX ADMIN — LORATADINE 10 MILLIGRAM(S): 10 TABLET ORAL at 12:01

## 2018-10-29 NOTE — DISCHARGE NOTE ADULT - MEDICATION SUMMARY - MEDICATIONS TO TAKE
I will START or STAY ON the medications listed below when I get home from the hospital:    aspirin 81 mg oral delayed release tablet  -- 1 tab(s) by mouth once a day  -- Indication: For general    loratadine 10 mg oral tablet  -- 1 tab(s) by mouth once a day  -- Indication: For allergic

## 2018-10-29 NOTE — PROGRESS NOTE ADULT - SUBJECTIVE AND OBJECTIVE BOX
Patient is a 58y old  Male who presents with a chief complaint of Unsteady gait and falls. (26 Oct 2018 10:09)        HPI:  59 y/o male no pmh BIB daughter for intermittent confusion and unsteady gait since falling 3 weeks ago. Pt states he tends to fall to right side; he denies muscle weakness,  but does complain of numbness of RLE. He denies headache, neck pain, dizziness or vertigo,  change of urinary or bowel habits, abd pain, N/V, rectal bleeding, melena, rash.  No fever/chills, No photophobia/eye pain/changes in vision, No ear pain/sore throat/dysphagia, No chest pain/palpitations, no SOB/cough/wheeze/stridor, No abdominal pain, No N/V/D, no dysuria/frequency/discharge, No neck/back pain. He takes no medications and drinks 1-2 beers/week. He is poor historian. (25 Oct 2018 06:17)      MEDICATIONS  (STANDING):  aspirin enteric coated 81 milliGRAM(s) Oral daily  influenza   Vaccine 0.5 milliLiter(s) IntraMuscular once  loratadine 10 milliGRAM(s) Oral daily    MEDICATIONS  (PRN):        Vital Signs Last 24 Hrs  T(C): 36.7 (29 Oct 2018 10:59), Max: 36.7 (28 Oct 2018 12:40)  T(F): 98 (29 Oct 2018 10:59), Max: 98 (28 Oct 2018 12:40)  HR: 78 (29 Oct 2018 10:59) (78 - 101)  BP: 95/85 (29 Oct 2018 10:59) (92/63 - 113/77)  BP(mean): --  RR: 18 (29 Oct 2018 10:59) (17 - 18)  SpO2: 99% (29 Oct 2018 10:59) (96% - 100%)    SUBJECTIVE & OBJECTIVE: Pt seen and examined at bedside.     GENERAL: NAD, well-groomed, well-developed  HEAD:  Atraumatic, Normocephalic  EYES: EOMI, PERRLA, conjunctiva and sclera clear  ENMT: Moist mucous membranes  NECK: Supple, No JVD  NERVOUS SYSTEM:  Alert & Oriented X3, Motor Strength 5/5 B/L upper and lower extremities; DTRs 2+ intact and symmetric  CHEST/LUNG: Clear to auscultation bilaterally; No rales, rhonchi, wheezing, or rubs  HEART: Regular rate and rhythm; No murmurs, rubs, or gallops  ABDOMEN: Soft, Nontender, Nondistended; Bowel sounds present  EXTREMITIES:  2+ Peripheral Pulses, No clubbing, cyanosis, or edema      LABS:                       RADIOLOGY & ADDITIONAL TESTS:      DVT/GI ppx  Discussed with pt @ bedside

## 2018-10-29 NOTE — DISCHARGE NOTE ADULT - PATIENT PORTAL LINK FT
You can access the [facility] Patient Portal, offered by [facility], by registering with the following website: [email or web address]

## 2018-10-29 NOTE — DISCHARGE NOTE ADULT - CARE PROVIDER_API CALL
Emy Ruiz), Neurology  [address] [place] [address]  Phone: [phone]  Fax: [phone] Emy Ruiz), Neurology  [address] [place] [address]  Phone: [phone]  Fax: [phone]    DR. Ruth,   Phone: (   )    -  Fax: (   )    -

## 2018-10-29 NOTE — DISCHARGE NOTE ADULT - CARE PLAN
Principal Discharge DX:	Unsteady gait  Goal:	continue with home pt  Assessment and plan of treatment:	continue with home pt  Secondary Diagnosis:	Resting tremor  Goal:	f/u with neurology  Secondary Diagnosis:	Chronic diastolic congestive heart failure  Goal:	stable

## 2018-10-29 NOTE — DISCHARGE NOTE ADULT - HOSPITAL COURSE
Assessment and Plan:   · Assessment	  59 y/o male with weakness, gait instability, and tremor     Problem/Plan - 1:  ·  Problem: R/O Vascular dementia without behavioral disturbance.  Plan: - MR head demonstrates chronic ischemic changes  - ? Cardiac history   - TSH wnl, B12 wnl, lyme and syphilis negative    - TTE wnl  - continue with ASA .   - Neurology eval noted   I obtained the MISC test code  ENS1 which will apply for encephalopathy  autoimmune evalautionijn france jabierum . i called core lab ad spoke to Edwin she informed that it would require one full red top tube for the caroline bellch has approximatly 15 teste. i had our labs onbtain 2 red top tubes just in case .   pataiment will follwo up with Dr. DAN on ened on next wek for prem murrell d/w patient and wife. in agreement .    [facility] lab and they emailed him the  appropriate lab and codes to order I provide him with my email so he can forward to me . once labs drawn patient can be discharged for follow up .  unclear about [facility] labs call placed to Dr. DAN await call back     Problem/Plan - 2:  ·  Problem: Weakness of both lower extremities.  Plan: - - PT evaluation noted  home with home PT      Problem/Plan - 3:  ·  Problem: Resting tremor.  Plan: - OT evaluation noted   - Neurology eval noted   -.     Attending Attestation:   55 minutes spent on total encounter; more than 50% of the visit was spent counseling and/or coordinating care by the attending physician.     [facility] Assessment and Plan:   	  57 y/o male with weakness, gait instability, and tremor     Problem/Plan - 1:  ·  Problem: R/O Vascular dementia without behavioral disturbance.  Plan: - MR head demonstrates chronic ischemic changes  - TSH wnl, B12 wnl, lyme and syphilis negative    - TTE grade 1 diastolic dysfunction  - continue with ASA .   - Neurology eval noted   I obtained the MISC test code ENS1 which will apply for encephalopathy  autoimmune evaluation the serum. I  called core lab and spoke to Edwin she informed that it would require one full red top tube for the panel which has approximately 15 teste. I had our labs obtain 2 red top tubes just in case .   patient will follow up with Dr. DAN on next week for test results d/w patient and wife and  in agreement .  Problem/Plan - 2:    ·  Problem: Weakness of both lower extremities.  Plan: - - PT evaluation noted  home with home PT  and cane     Problem/Plan - 3:  ·  Problem: Resting tremor.  Plan: - OT evaluation noted   - Neurology eval noted   -.    CHF diastolic and chronic stable     TIME SPENT COMPLETING DISCHARGE AND COORDINATING CARE WITH NURSING,  AND CASE MANAGEMENT APPROX 40MINUTES Assessment and Plan:   	  59 y/o male with weakness, gait instability, and tremor     Problem/Plan - 1:  ·  Problem: R/O Vascular dementia without behavioral disturbance.  Plan: - MR head demonstrates chronic ischemic changes  - TSH wnl, B12 wnl, lyme and syphilis negative    - TTE grade 1 diastolic dysfunction  - continue with ASA .   - Neurology eval noted   I obtained the MISC test code ENS1 which will apply for encephalopathy  autoimmune evaluation the serum. I  called core lab and spoke to Edwin she informed that it would require one full red top tube for the panel which has approximately 15 teste. I had our labs obtain 2 red top tubes just in case .   patient will follow up with Dr. DAN on next week for test results d/w patient and wife and  in agreement .      ·  Problem: Weakness of both lower extremities.  Plan: - - PT evaluation noted  home with home PT  and cane and walker      Problem/Plan - 3:  ·  Problem: Resting tremor.  Plan: - OT evaluation noted   - Neurology eval noted   -.    CHF diastolic and chronic stable     TIME SPENT COMPLETING DISCHARGE AND COORDINATING CARE WITH NURSING,  AND CASE MANAGEMENT APPROX 40MINUTES Assessment and Plan:   	  59 y/o male with weakness, gait instability, and tremor     Problem/Plan - 1:  ·  Problem: R/O Vascular dementia without behavioral disturbance.  Plan: - MR head demonstrates chronic ischemic changes  - TSH wnl, B12 wnl, lyme and syphilis negative    - TTE grade 1 diastolic dysfunction stable and  no meds as bp low normal  - continue with ASA .   - Neurology eval noted   I obtained the MISC test code ENS1 which will apply for encephalopathy  autoimmune evaluation the serum. I  called core lab and spoke to Edwin she informed that it would require one full red top tube for the panel which has approximately 15 teste. I had our labs obtain 2 red top tubes just in case .   patient will follow up with Dr. DAN on next week for test results d/w patient and wife and  in agreement .      ·  Problem: Weakness of both lower extremities.  Plan: - - PT evaluation noted  home with home PT  and cane and walker      Problem/Plan - 3:  ·  Problem: Resting tremor.  Plan: - OT evaluation noted   - Neurology eval noted   -.    CHF diastolic and presumed chronic stable . no meds as bp low normal    TIME SPENT COMPLETING DISCHARGE AND COORDINATING CARE WITH NURSING,  AND CASE MANAGEMENT APPROX 40MINUTES

## 2018-10-29 NOTE — DISCHARGE NOTE ADULT - ADDITIONAL INSTRUCTIONS
PATIENT HAS A FOLLOW UP APPOINTMENT WITH NEUROLOGIST, DR. MITCHELL ON SATURDAY, NOVEMBER 10, 2018 AT 10:30 am  [address] [place] [address] PHONE [phone].

## 2018-10-29 NOTE — DISCHARGE NOTE ADULT - PROVIDER TOKENS
MERYL:'[id number]' TOKEN:'[id number]',FREE:[LAST:[DR. Ruth],PHONE:[(   )    -],FAX:[(   )    -]]

## 2018-10-29 NOTE — PROGRESS NOTE ADULT - ASSESSMENT
59 y/o male with weakness, gait instability, and tremor    Problem/Plan - 1:  ·  Problem: R/O Vascular dementia without behavioral disturbance.  Plan: - MR head demonstrates chronic ischemic changes  - ? Cardiac history   - TSH wnl, B12 wnl, following lyme titers pending   - TTE wnl  - continue with ASA .   - Neurology eval noted unclear about [facility] labs call placed to Dr. DAN awadomo call back   f/u EEG     Problem/Plan - 2:  ·  Problem: Weakness of both lower extremities.  Plan: - - PT evaluation noted  home with home PT     Problem/Plan - 3:  ·  Problem: Resting tremor.  Plan: - OT evaluation noted   - Neurology eval noted   -.

## 2018-10-30 LAB
CULTURE RESULTS: SIGNIFICANT CHANGE UP
CULTURE RESULTS: SIGNIFICANT CHANGE UP
SPECIMEN SOURCE: SIGNIFICANT CHANGE UP
SPECIMEN SOURCE: SIGNIFICANT CHANGE UP

## 2018-10-30 PROCEDURE — 99231 SBSQ HOSP IP/OBS SF/LOW 25: CPT

## 2018-10-30 RX ORDER — ACETAMINOPHEN 500 MG
650 TABLET ORAL EVERY 6 HOURS
Qty: 0 | Refills: 0 | Status: DISCONTINUED | OUTPATIENT
Start: 2018-10-30 | End: 2018-10-31

## 2018-10-30 RX ADMIN — Medication 650 MILLIGRAM(S): at 22:04

## 2018-10-30 RX ADMIN — Medication 650 MILLIGRAM(S): at 20:51

## 2018-10-30 RX ADMIN — LORATADINE 10 MILLIGRAM(S): 10 TABLET ORAL at 11:47

## 2018-10-30 RX ADMIN — Medication 81 MILLIGRAM(S): at 11:47

## 2018-10-30 NOTE — PROGRESS NOTE ADULT - ASSESSMENT
59 y/o male with weakness, gait instability, and tremor    Problem/Plan - 1:  ·  Problem: R/O Vascular dementia without behavioral disturbance.  Plan: - MR head demonstrates chronic ischemic changes  - ? Cardiac history   - TSH wnl, B12 wnl, following lyme titers  negative   - TTE wnl  - continue with ASA .   - Neurology eval noted d/w DR. DAN on today  he was able to get in contact with [facility] lab and they emailed him the  appropriate lab and codes to order I provide him with my email so he can forward to me . once labs drawn patient can be discharged for follow up .  [facility] call placed to Dr. DAN await call back   f/u EEG     Problem/Plan - 2:  ·  Problem: Weakness of both lower extremities.  Plan: - - PT evaluation noted  home with home PT     Problem/Plan - 3:  ·  Problem: Resting tremor.  Plan: - OT evaluation noted   - Neurology eval noted   -.

## 2018-10-30 NOTE — PROGRESS NOTE ADULT - SUBJECTIVE AND OBJECTIVE BOX
Patient is a 58y old  Male who presents with a chief complaint of Unsteady gait and falls. (26 Oct 2018 10:09)        HPI:  59 y/o male no pmh BIB daughter for intermittent confusion and unsteady gait since falling 3 weeks ago. Pt states he tends to fall to right side; he denies muscle weakness,  but does complain of numbness of RLE. He denies headache, neck pain, dizziness or vertigo,  change of urinary or bowel habits, abd pain, N/V, rectal bleeding, melena, rash.  No fever/chills, No photophobia/eye pain/changes in vision, No ear pain/sore throat/dysphagia, No chest pain/palpitations, no SOB/cough/wheeze/stridor, No abdominal pain, No N/V/D, no dysuria/frequency/discharge, No neck/back pain. He takes no medications and drinks 1-2 beers/week. He is poor historian. (25 Oct 2018 06:17)      MEDICATIONS  (STANDING):  aspirin enteric coated 81 milliGRAM(s) Oral daily  influenza   Vaccine 0.5 milliLiter(s) IntraMuscular once  loratadine 10 milliGRAM(s) Oral daily    MEDICATIONS  (PRN):      Vital Signs Last 24 Hrs  T(C): 36.7 (30 Oct 2018 05:55), Max: 36.7 (29 Oct 2018 10:59)  T(F): 98.1 (30 Oct 2018 05:55), Max: 98.1 (30 Oct 2018 05:55)  HR: 88 (30 Oct 2018 05:55) (78 - 96)  BP: 101/70 (30 Oct 2018 05:55) (95/85 - 102/65)  BP(mean): --  RR: 18 (30 Oct 2018 05:55) (18 - 18)  SpO2: 96% (30 Oct 2018 05:55) (94% - 99%)      SUBJECTIVE & OBJECTIVE: Pt seen and examined at bedside.     GENERAL: NAD, well-groomed, well-developed  HEAD:  Atraumatic, Normocephalic  EYES: EOMI, PERRLA, conjunctiva and sclera clear  ENMT: Moist mucous membranes  NECK: Supple, No JVD  NERVOUS SYSTEM:  Alert & Oriented X3, Motor Strength 5/5 B/L upper and lower extremities; DTRs 2+ intact and symmetric  CHEST/LUNG: Clear to auscultation bilaterally; No rales, rhonchi, wheezing, or rubs  HEART: Regular rate and rhythm; No murmurs, rubs, or gallops  ABDOMEN: Soft, Nontender, Nondistended; Bowel sounds present  EXTREMITIES:  2+ Peripheral Pulses, No clubbing, cyanosis, or edema      LABS:                         RADIOLOGY & ADDITIONAL TESTS:      DVT/GI ppx  Discussed with pt @ bedside

## 2018-10-31 VITALS
OXYGEN SATURATION: 97 % | RESPIRATION RATE: 18 BRPM | HEART RATE: 96 BPM | SYSTOLIC BLOOD PRESSURE: 110 MMHG | DIASTOLIC BLOOD PRESSURE: 73 MMHG | TEMPERATURE: 98 F

## 2018-10-31 PROCEDURE — 99239 HOSP IP/OBS DSCHRG MGMT >30: CPT

## 2018-10-31 RX ORDER — LORATADINE 10 MG/1
1 TABLET ORAL
Qty: 0 | Refills: 0 | COMMUNITY
Start: 2018-10-31

## 2018-10-31 RX ORDER — ASPIRIN/CALCIUM CARB/MAGNESIUM 324 MG
1 TABLET ORAL
Qty: 0 | Refills: 0 | COMMUNITY
Start: 2018-10-31

## 2018-10-31 RX ADMIN — Medication 81 MILLIGRAM(S): at 11:39

## 2018-10-31 RX ADMIN — LORATADINE 10 MILLIGRAM(S): 10 TABLET ORAL at 11:39

## 2018-10-31 NOTE — DIETITIAN INITIAL EVALUATION ADULT. - PHYSICAL APPEARANCE
BMI=23.5; no edema; Nutrition focused physical exam conducted; Subcutaneous fat loss: [WNL ] Orbital fat pads region, [ WNL]Buccal fat region, [WNL ]Triceps region,  [WNL ]Ribs region.  Muscle wasting: [WNL ]Temples region, [WNL ]Clavicle region, [WNL ]Shoulder region, [Unable ]Scapula region, [Mild ]Interosseous region,  [WNL ]thigh region, [WNL ]Calf region/well nourished

## 2018-10-31 NOTE — DIETITIAN INITIAL EVALUATION ADULT. - OTHER INFO
Pt seen for LOS.  Pt lives with wife & daughter; Unable to obtain wt & diet hx due to impaired cognitive status. Pt with decreased po intake observed 40% breakfast this am. Pt with natural teeth reports no difficulty chewing or swallowing.

## 2018-10-31 NOTE — DIETITIAN INITIAL EVALUATION ADULT. - PROBLEM SELECTOR PLAN 2
Order faxed to [facility] at [phone] and confirmation received. Dr. Kathryn Jensen has an appointment for the patient 2/12/18 arrive at 10 AM.  Patient had an appointment in Jan. which he cancelled. MR, B12, rpr, ? early dementia

## 2018-10-31 NOTE — DIETITIAN INITIAL EVALUATION ADULT. - PERTINENT LABORATORY DATA
10-26 Na 141 mmol/L Glu 87 mg/dL K+ 3.7 mmol/L Cr 1.01 mg/dL BUN 10 mg/dL Phos n/a   Alb 3.4 g/dL PAB n/a   Hgb 13.2 g/dL Hct 37.1 %<L> HgA1C n/a     24Hr FS:

## 2018-11-14 DIAGNOSIS — R26.89 OTHER ABNORMALITIES OF GAIT AND MOBILITY: ICD-10-CM

## 2018-11-14 DIAGNOSIS — D69.6 THROMBOCYTOPENIA, UNSPECIFIED: ICD-10-CM

## 2018-11-14 DIAGNOSIS — G25.2 OTHER SPECIFIED FORMS OF TREMOR: ICD-10-CM

## 2018-11-14 DIAGNOSIS — Z91.81 HISTORY OF FALLING: ICD-10-CM

## 2018-11-14 DIAGNOSIS — R41.0 DISORIENTATION, UNSPECIFIED: ICD-10-CM

## 2018-11-14 DIAGNOSIS — I50.32 CHRONIC DIASTOLIC (CONGESTIVE) HEART FAILURE: ICD-10-CM

## 2018-11-14 DIAGNOSIS — R53.1 WEAKNESS: ICD-10-CM

## 2018-11-15 LAB — MISCELLANEOUS TEST NAME: SIGNIFICANT CHANGE UP

## 2018-12-01 ENCOUNTER — INPATIENT (INPATIENT)
Facility: HOSPITAL | Age: 58
LOS: 11 days | Discharge: NURSING, REHAB OR LONG-TERM CARE | End: 2018-12-13
Attending: INTERNAL MEDICINE | Admitting: INTERNAL MEDICINE
Payer: MEDICAID

## 2018-12-01 VITALS
HEART RATE: 110 BPM | RESPIRATION RATE: 16 BRPM | WEIGHT: 210.1 LBS | TEMPERATURE: 99 F | DIASTOLIC BLOOD PRESSURE: 51 MMHG | SYSTOLIC BLOOD PRESSURE: 121 MMHG | HEIGHT: 69 IN | OXYGEN SATURATION: 98 %

## 2018-12-01 DIAGNOSIS — Z98.890 OTHER SPECIFIED POSTPROCEDURAL STATES: Chronic | ICD-10-CM

## 2018-12-01 DIAGNOSIS — N39.0 URINARY TRACT INFECTION, SITE NOT SPECIFIED: ICD-10-CM

## 2018-12-01 LAB
ALBUMIN SERPL ELPH-MCNC: 3.6 G/DL — SIGNIFICANT CHANGE UP (ref 3.3–5)
ALP SERPL-CCNC: 57 U/L — SIGNIFICANT CHANGE UP (ref 40–120)
ALT FLD-CCNC: 37 U/L — SIGNIFICANT CHANGE UP (ref 12–78)
ANION GAP SERPL CALC-SCNC: 9 MMOL/L — SIGNIFICANT CHANGE UP (ref 5–17)
APPEARANCE UR: ABNORMAL
APTT BLD: 28.2 SEC — SIGNIFICANT CHANGE UP (ref 27.5–36.3)
AST SERPL-CCNC: 28 U/L — SIGNIFICANT CHANGE UP (ref 15–37)
BACTERIA # UR AUTO: ABNORMAL
BASOPHILS # BLD AUTO: 0.02 K/UL — SIGNIFICANT CHANGE UP (ref 0–0.2)
BASOPHILS NFR BLD AUTO: 0.3 % — SIGNIFICANT CHANGE UP (ref 0–2)
BILIRUB SERPL-MCNC: 1.3 MG/DL — HIGH (ref 0.2–1.2)
BILIRUB UR-MCNC: NEGATIVE — SIGNIFICANT CHANGE UP
BUN SERPL-MCNC: 14 MG/DL — SIGNIFICANT CHANGE UP (ref 7–23)
CALCIUM SERPL-MCNC: 9.4 MG/DL — SIGNIFICANT CHANGE UP (ref 8.5–10.1)
CHLORIDE SERPL-SCNC: 107 MMOL/L — SIGNIFICANT CHANGE UP (ref 96–108)
CO2 SERPL-SCNC: 26 MMOL/L — SIGNIFICANT CHANGE UP (ref 22–31)
COLOR SPEC: YELLOW — SIGNIFICANT CHANGE UP
CREAT SERPL-MCNC: 1.09 MG/DL — SIGNIFICANT CHANGE UP (ref 0.5–1.3)
DIFF PNL FLD: ABNORMAL
EOSINOPHIL # BLD AUTO: 0.04 K/UL — SIGNIFICANT CHANGE UP (ref 0–0.5)
EOSINOPHIL NFR BLD AUTO: 0.5 % — SIGNIFICANT CHANGE UP (ref 0–6)
GLUCOSE SERPL-MCNC: 99 MG/DL — SIGNIFICANT CHANGE UP (ref 70–99)
GLUCOSE UR QL: NEGATIVE MG/DL — SIGNIFICANT CHANGE UP
HCT VFR BLD CALC: 37.7 % — LOW (ref 39–50)
HGB BLD-MCNC: 13.2 G/DL — SIGNIFICANT CHANGE UP (ref 13–17)
IMM GRANULOCYTES NFR BLD AUTO: 3 % — HIGH (ref 0–1.5)
INR BLD: 1.21 RATIO — HIGH (ref 0.88–1.16)
KETONES UR-MCNC: NEGATIVE — SIGNIFICANT CHANGE UP
LACTATE SERPL-SCNC: 1.7 MMOL/L — SIGNIFICANT CHANGE UP (ref 0.7–2)
LEUKOCYTE ESTERASE UR-ACNC: ABNORMAL
LYMPHOCYTES # BLD AUTO: 2.54 K/UL — SIGNIFICANT CHANGE UP (ref 1–3.3)
LYMPHOCYTES # BLD AUTO: 34.5 % — SIGNIFICANT CHANGE UP (ref 13–44)
MCHC RBC-ENTMCNC: 29.3 PG — SIGNIFICANT CHANGE UP (ref 27–34)
MCHC RBC-ENTMCNC: 35 GM/DL — SIGNIFICANT CHANGE UP (ref 32–36)
MCV RBC AUTO: 83.8 FL — SIGNIFICANT CHANGE UP (ref 80–100)
MONOCYTES # BLD AUTO: 0.88 K/UL — SIGNIFICANT CHANGE UP (ref 0–0.9)
MONOCYTES NFR BLD AUTO: 12 % — SIGNIFICANT CHANGE UP (ref 2–14)
NEUTROPHILS # BLD AUTO: 3.66 K/UL — SIGNIFICANT CHANGE UP (ref 1.8–7.4)
NEUTROPHILS NFR BLD AUTO: 49.7 % — SIGNIFICANT CHANGE UP (ref 43–77)
NITRITE UR-MCNC: POSITIVE
NRBC # BLD: 0 /100 WBCS — SIGNIFICANT CHANGE UP (ref 0–0)
PH UR: 6 — SIGNIFICANT CHANGE UP (ref 5–8)
PLATELET # BLD AUTO: 66 K/UL — LOW (ref 150–400)
POTASSIUM SERPL-MCNC: 4.1 MMOL/L — SIGNIFICANT CHANGE UP (ref 3.5–5.3)
POTASSIUM SERPL-SCNC: 4.1 MMOL/L — SIGNIFICANT CHANGE UP (ref 3.5–5.3)
PROT SERPL-MCNC: 10 GM/DL — HIGH (ref 6–8.3)
PROT UR-MCNC: 100 MG/DL
PROTHROM AB SERPL-ACNC: 13.6 SEC — HIGH (ref 10–12.9)
RBC # BLD: 4.5 M/UL — SIGNIFICANT CHANGE UP (ref 4.2–5.8)
RBC # FLD: 14.1 % — SIGNIFICANT CHANGE UP (ref 10.3–14.5)
RBC CASTS # UR COMP ASSIST: SIGNIFICANT CHANGE UP /HPF (ref 0–4)
SODIUM SERPL-SCNC: 142 MMOL/L — SIGNIFICANT CHANGE UP (ref 135–145)
SP GR SPEC: 1.01 — SIGNIFICANT CHANGE UP (ref 1.01–1.02)
UROBILINOGEN FLD QL: 4 MG/DL
WBC # BLD: 7.36 K/UL — SIGNIFICANT CHANGE UP (ref 3.8–10.5)
WBC # FLD AUTO: 7.36 K/UL — SIGNIFICANT CHANGE UP (ref 3.8–10.5)
WBC UR QL: >50

## 2018-12-01 PROCEDURE — 99285 EMERGENCY DEPT VISIT HI MDM: CPT

## 2018-12-01 PROCEDURE — 93010 ELECTROCARDIOGRAM REPORT: CPT

## 2018-12-01 PROCEDURE — 70450 CT HEAD/BRAIN W/O DYE: CPT | Mod: 26

## 2018-12-01 PROCEDURE — 71045 X-RAY EXAM CHEST 1 VIEW: CPT | Mod: 26

## 2018-12-01 PROCEDURE — 72125 CT NECK SPINE W/O DYE: CPT | Mod: 26

## 2018-12-01 PROCEDURE — 72131 CT LUMBAR SPINE W/O DYE: CPT | Mod: 26

## 2018-12-01 RX ORDER — CEFTRIAXONE 500 MG/1
1 INJECTION, POWDER, FOR SOLUTION INTRAMUSCULAR; INTRAVENOUS EVERY 24 HOURS
Qty: 0 | Refills: 0 | Status: DISCONTINUED | OUTPATIENT
Start: 2018-12-02 | End: 2018-12-06

## 2018-12-01 RX ORDER — ACETAMINOPHEN 500 MG
325 TABLET ORAL EVERY 4 HOURS
Qty: 0 | Refills: 0 | Status: DISCONTINUED | OUTPATIENT
Start: 2018-12-01 | End: 2018-12-13

## 2018-12-01 RX ORDER — CEFTRIAXONE 500 MG/1
1 INJECTION, POWDER, FOR SOLUTION INTRAMUSCULAR; INTRAVENOUS ONCE
Qty: 0 | Refills: 0 | Status: COMPLETED | OUTPATIENT
Start: 2018-12-01 | End: 2018-12-01

## 2018-12-01 RX ORDER — SODIUM CHLORIDE 9 MG/ML
1000 INJECTION INTRAMUSCULAR; INTRAVENOUS; SUBCUTANEOUS
Qty: 0 | Refills: 0 | Status: DISCONTINUED | OUTPATIENT
Start: 2018-12-01 | End: 2018-12-07

## 2018-12-01 RX ORDER — SODIUM CHLORIDE 9 MG/ML
1500 INJECTION INTRAMUSCULAR; INTRAVENOUS; SUBCUTANEOUS ONCE
Qty: 0 | Refills: 0 | Status: COMPLETED | OUTPATIENT
Start: 2018-12-01 | End: 2018-12-01

## 2018-12-01 RX ORDER — ACETAMINOPHEN 500 MG
650 TABLET ORAL ONCE
Qty: 0 | Refills: 0 | Status: COMPLETED | OUTPATIENT
Start: 2018-12-01 | End: 2018-12-01

## 2018-12-01 RX ORDER — ASPIRIN/CALCIUM CARB/MAGNESIUM 324 MG
81 TABLET ORAL DAILY
Qty: 0 | Refills: 0 | Status: DISCONTINUED | OUTPATIENT
Start: 2018-12-01 | End: 2018-12-13

## 2018-12-01 RX ORDER — SODIUM CHLORIDE 9 MG/ML
1000 INJECTION INTRAMUSCULAR; INTRAVENOUS; SUBCUTANEOUS ONCE
Qty: 0 | Refills: 0 | Status: COMPLETED | OUTPATIENT
Start: 2018-12-01 | End: 2018-12-01

## 2018-12-01 RX ADMIN — SODIUM CHLORIDE 75 MILLILITER(S): 9 INJECTION INTRAMUSCULAR; INTRAVENOUS; SUBCUTANEOUS at 22:30

## 2018-12-01 RX ADMIN — Medication 325 MILLIGRAM(S): at 22:25

## 2018-12-01 RX ADMIN — SODIUM CHLORIDE 750 MILLILITER(S): 9 INJECTION INTRAMUSCULAR; INTRAVENOUS; SUBCUTANEOUS at 16:44

## 2018-12-01 RX ADMIN — Medication 650 MILLIGRAM(S): at 18:15

## 2018-12-01 RX ADMIN — Medication 325 MILLIGRAM(S): at 21:25

## 2018-12-01 RX ADMIN — SODIUM CHLORIDE 1000 MILLILITER(S): 9 INJECTION INTRAMUSCULAR; INTRAVENOUS; SUBCUTANEOUS at 13:30

## 2018-12-01 RX ADMIN — CEFTRIAXONE 100 GRAM(S): 500 INJECTION, POWDER, FOR SOLUTION INTRAMUSCULAR; INTRAVENOUS at 18:05

## 2018-12-01 NOTE — ED ADULT NURSE NOTE - CAS TRG GEN SKIN CONDITION
Cough started 2 weeks , was dry cough at night. This week is productive. Sleeps well, cough is worse in am. No fever. Drinking well. Is more tired. No trouble breathing or wheezing. No sore throat. Is playful at times. Mom will watch and call if worsening or new symptoms. Mother voiced understanding.   Dry/Warm

## 2018-12-01 NOTE — PATIENT PROFILE ADULT - PRIMARY SOURCE OF SUPPORT/COMFORT
Pt. is here today for follow up on Diabetes. Pt was last seen on 10/10/16.    1. What treatments are you currently using? Metformin 1000 BID, Invkana 100 mg QD, Glipizide 10 mg BID, Toujeo 44 units HS  2. How many year do you have diabetes? 17, how many years are you on insulin? 2  3. How often do you self monitor? 2-3 times a day but not every day    4. Any complication that you know of from your diabetes? None  5. How often do you experience low blood sugar? No    6. How do you correct your hypoglycemia? OJ or candy bar   7. Any 911 calls/glycagon/outside assistance in treating low sugar? No   8. Do you carry fast-acting glucose? No   9. Do you carry \" I have diabetes\" identification? Has a card in his wallet   10. Are you on ASA and ACEI? Takes  mg PRN, No ACEI.  11. When was your last ophthal exam? 7/29/16  Concerns: none at this time     Denies known Latex allergy or symptoms of Latex sensitivity.   Medication list verified.   Tobacco use verified.    child(robin)

## 2018-12-01 NOTE — ED ADULT NURSE NOTE - NSIMPLEMENTINTERV_GEN_ALL_ED
Implemented All Fall with Harm Risk Interventions:  [place] to call system. Call bell, personal items and telephone within reach. Instruct patient to call for assistance. Room bathroom lighting operational. Non-slip footwear when patient is off stretcher. Physically safe environment: no spills, clutter or unnecessary equipment. Stretcher in lowest position, wheels locked, appropriate side rails in place. Provide visual cue, wrist band, yellow gown, etc. Monitor gait and stability. Monitor for mental status changes and reorient to person, place, and time. Review medications for side effects contributing to fall risk. Reinforce activity limits and safety measures with patient and family. Provide visual clues: red socks.

## 2018-12-01 NOTE — ED ADULT NURSE NOTE - OBJECTIVE STATEMENT
received pt to bed 11. alert confused. BIBA for change in Mental status past few days with fever per EMS family states pt has been steadily declining over past month. non verbal at this time. pt poor historian unable to provide history. labs obtained. pt placed on cm. awaiting evaluation by md. received pt to bed 11. alert confused. BIBA for change in Mental status past few days with fever per EMS family states pt has been steadily declining over past month.  as per daughter pt has been very weak and unable to get out of bed for the last 2 days. non verbal at this time. . labs obtained. pt placed on cm. awaiting evaluation by md.

## 2018-12-01 NOTE — ED PROVIDER NOTE - OBJECTIVE STATEMENT
58 year old male presents today accompanied with his daughter who reports that he has not been himself for the last three days, he appears very weak, confused and has poor appetite, at baseline pt is alert and oriented x 3 and independent, (-) chest pain (-) sob (_) nausea (-) vomiting (-) diarrhea (-) abdomional pain, pt did fall one week ago, his daughter woke up and found him on the floor lying on his back, he did not recall how he got there

## 2018-12-01 NOTE — H&P ADULT - NSHPLABSRESULTS_GEN_ALL_CORE
CBC Full  -  ( 01 Dec 2018 13:32 )  WBC Count : 7.36 K/uL  Hemoglobin : 13.2 g/dL  Hematocrit : 37.7 %  Platelet Count - Automated : 66 K/uL  Mean Cell Volume : 83.8 fl  Mean Cell Hemoglobin : 29.3 pg  Mean Cell Hemoglobin Concentration : 35.0 gm/dL  Auto Neutrophil # : 3.66 K/uL  Auto Lymphocyte # : 2.54 K/uL  Auto Monocyte # : 0.88 K/uL  Auto Eosinophil # : 0.04 K/uL  Auto Basophil # : 0.02 K/uL  Auto Neutrophil % : 49.7 %  Auto Lymphocyte % : 34.5 %  Auto Monocyte % : 12.0 %  Auto Eosinophil % : 0.5 %  Auto Basophil % : 0.3 %  01 Dec 2018 13:32    142    |  107    |  14     ----------------------------<  99     4.1     |  26     |  1.09     Ca    9.4        01 Dec 2018 13:32    TPro  10.0   /  Alb  3.6    /  TBili  1.3    /  DBili  x      /  AST  28     /  ALT  37     /  AlkPhos  57     01 Dec 2018 13:32  < from: CT Lumbar Spine No Cont (12.01.18 @ 15:58) >    MPRESSION:  Bulging discs L3-S1. No fracture identified.    < end of copied text >

## 2018-12-01 NOTE — ED ADULT TRIAGE NOTE - CHIEF COMPLAINT QUOTE
change in Mental status past few days with fever per EMS family states pt has been steadily declining over past month

## 2018-12-02 DIAGNOSIS — R56.9 UNSPECIFIED CONVULSIONS: ICD-10-CM

## 2018-12-02 LAB
-  COAGULASE NEGATIVE STAPHYLOCOCCUS: SIGNIFICANT CHANGE UP
METHOD TYPE: SIGNIFICANT CHANGE UP

## 2018-12-02 RX ORDER — PIPERACILLIN AND TAZOBACTAM 4; .5 G/20ML; G/20ML
3.38 INJECTION, POWDER, LYOPHILIZED, FOR SOLUTION INTRAVENOUS EVERY 8 HOURS
Qty: 0 | Refills: 0 | Status: DISCONTINUED | OUTPATIENT
Start: 2018-12-02 | End: 2018-12-02

## 2018-12-02 RX ORDER — VANCOMYCIN HCL 1 G
1000 VIAL (EA) INTRAVENOUS EVERY 12 HOURS
Qty: 0 | Refills: 0 | Status: DISCONTINUED | OUTPATIENT
Start: 2018-12-02 | End: 2018-12-03

## 2018-12-02 RX ORDER — HEPARIN SODIUM 5000 [USP'U]/ML
5000 INJECTION INTRAVENOUS; SUBCUTANEOUS EVERY 12 HOURS
Qty: 0 | Refills: 0 | Status: DISCONTINUED | OUTPATIENT
Start: 2018-12-02 | End: 2018-12-02

## 2018-12-02 RX ADMIN — Medication 250 MILLIGRAM(S): at 21:17

## 2018-12-02 RX ADMIN — CEFTRIAXONE 100 GRAM(S): 500 INJECTION, POWDER, FOR SOLUTION INTRAMUSCULAR; INTRAVENOUS at 05:02

## 2018-12-02 RX ADMIN — Medication 81 MILLIGRAM(S): at 11:00

## 2018-12-02 RX ADMIN — SODIUM CHLORIDE 75 MILLILITER(S): 9 INJECTION INTRAMUSCULAR; INTRAVENOUS; SUBCUTANEOUS at 17:06

## 2018-12-02 NOTE — PROGRESS NOTE ADULT - SUBJECTIVE AND OBJECTIVE BOX
Patient is a 58y old  Male who presents with a chief complaint of     INTERVAL HPI/OVERNIGHT EVENTS:  Patient feels better.  Spoke with daughters.  Patient had tonic clonic seizures.  Patient alert and oriented.  MEDICATIONS  (STANDING):  aspirin enteric coated 81 milliGRAM(s) Oral daily  cefTRIAXone   IVPB 1 Gram(s) IV Intermittent every 24 hours  sodium chloride 0.9%. 1000 milliLiter(s) (75 mL/Hr) IV Continuous <Continuous>    MEDICATIONS  (PRN):  acetaminophen   Tablet .. 325 milliGRAM(s) Oral every 4 hours PRN Temp greater or equal to 38C (100.4F)      Allergies    No Known Allergies    Intolerances        REVIEW OF SYSTEMS:  CONSTITUTIONAL: No fever, weight loss, or fatigue  EYES: No eye pain, visual disturbances, or discharge  ENMT:  No difficulty hearing, tinnitus, vertigo; No sinus or throat pain  NECK: No pain or stiffness  BREASTS: No pain, masses, or nipple discharge  RESPIRATORY: No cough, wheezing, chills or hemoptysis; No shortness of breath  CARDIOVASCULAR: No chest pain, palpitations, dizziness, or leg swelling  GASTROINTESTINAL: No abdominal or epigastric pain. No nausea, vomiting, or hematemesis; No diarrhea or constipation. No melena or hematochezia.  GENITOURINARY: No dysuria, frequency, hematuria, or incontinence  NEUROLOGICAL: No headaches, memory loss, loss of strength, numbness, or tremors  SKIN: No itching, burning, rashes, or lesions   LYMPH NODES: No enlarged glands  ENDOCRINE: No heat or cold intolerance; No hair loss  MUSCULOSKELETAL: No joint pain or swelling; No muscle, back, or extremity pain  PSYCHIATRIC: No depression, anxiety, mood swings, or difficulty sleeping  HEME/LYMPH: No easy bruising, or bleeding gums  ALLERGY AND IMMUNOLOGIC: No hives or eczema    Vital Signs Last 24 Hrs  T(C): 36.8 (02 Dec 2018 17:03), Max: 39.3 (01 Dec 2018 21:05)  T(F): 98.2 (02 Dec 2018 17:03), Max: 102.7 (01 Dec 2018 21:05)  HR: 78 (02 Dec 2018 17:03) (71 - 108)  BP: 109/71 (02 Dec 2018 17:03) (109/68 - 150/77)  BP(mean): --  RR: 18 (02 Dec 2018 17:03) (16 - 19)  SpO2: 99% (02 Dec 2018 17:03) (96% - 99%)    PHYSICAL EXAM:  GENERAL: NAD, well-groomed, well-developed  HEAD:  Atraumatic, Normocephalic  EYES: EOMI, PERRLA, conjunctiva and sclera clear  ENMT: No tonsillar erythema, exudates, or enlargement; Moist mucous membranes, Good dentition, No lesions  NECK: Supple, No JVD, Normal thyroid  NERVOUS SYSTEM:  Alert & Oriented X3, Good concentration; Motor Strength 5/5 B/L upper and lower extremities; DTRs 2+ intact and symmetric  CHEST/LUNG: Clear to percussion bilaterally; No rales, rhonchi, wheezing, or rubs  HEART: Regular rate and rhythm; No murmurs, rubs, or gallops  ABDOMEN: Soft, Nontender, Nondistended; Bowel sounds present  EXTREMITIES:  2+ Peripheral Pulses, No clubbing, cyanosis, or edema  LYMPH: No lymphadenopathy noted  SKIN: No rashes or lesions    LABS:                        13.2   7.36  )-----------( 66       ( 01 Dec 2018 13:32 )             37.7     12    142  |  107  |  14  ----------------------------<  99  4.1   |  26  |  1.09    Ca    9.4      01 Dec 2018 13:32    TPro  10.0<H>  /  Alb  3.6  /  TBili  1.3<H>  /  DBili  x   /  AST  28  /  ALT  37  /  AlkPhos  57  12    PT/INR - ( 01 Dec 2018 13:32 )   PT: 13.6 sec;   INR: 1.21 ratio         PTT - ( 01 Dec 2018 13:32 )  PTT:28.2 sec  Urinalysis Basic - ( 01 Dec 2018 16:31 )    Color: Yellow / Appearance: very cloudy / S.015 / pH: x  Gluc: x / Ketone: Negative  / Bili: Negative / Urobili: 4 mg/dL   Blood: x / Protein: 100 mg/dL / Nitrite: Positive   Leuk Esterase: Moderate / RBC: 0-2 /HPF / WBC >50   Sq Epi: x / Non Sq Epi: x / Bacteria: Many      CAPILLARY BLOOD GLUCOSE        CULTURES:  Culture Results:   >100,000 CFU/ml Escherichia coli ( @ 00:53)    HEMOGLOBIN A1C:    CHOLESTEROL:        RADIOLOGY & ADDITIONAL TESTS:

## 2018-12-03 LAB
-  AMIKACIN: SIGNIFICANT CHANGE UP
-  AMOXICILLIN/CLAVULANIC ACID: SIGNIFICANT CHANGE UP
-  AMPICILLIN/SULBACTAM: SIGNIFICANT CHANGE UP
-  AMPICILLIN: SIGNIFICANT CHANGE UP
-  AZTREONAM: SIGNIFICANT CHANGE UP
-  CEFAZOLIN: SIGNIFICANT CHANGE UP
-  CEFEPIME: SIGNIFICANT CHANGE UP
-  CEFOXITIN: SIGNIFICANT CHANGE UP
-  CEFTRIAXONE: SIGNIFICANT CHANGE UP
-  CIPROFLOXACIN: SIGNIFICANT CHANGE UP
-  ERTAPENEM: SIGNIFICANT CHANGE UP
-  GENTAMICIN: SIGNIFICANT CHANGE UP
-  IMIPENEM: SIGNIFICANT CHANGE UP
-  LEVOFLOXACIN: SIGNIFICANT CHANGE UP
-  MEROPENEM: SIGNIFICANT CHANGE UP
-  NITROFURANTOIN: SIGNIFICANT CHANGE UP
-  PIPERACILLIN/TAZOBACTAM: SIGNIFICANT CHANGE UP
-  TIGECYCLINE: SIGNIFICANT CHANGE UP
-  TOBRAMYCIN: SIGNIFICANT CHANGE UP
-  TRIMETHOPRIM/SULFAMETHOXAZOLE: SIGNIFICANT CHANGE UP
CULTURE RESULTS: SIGNIFICANT CHANGE UP
CULTURE RESULTS: SIGNIFICANT CHANGE UP
GRAM STN FLD: SIGNIFICANT CHANGE UP
METHOD TYPE: SIGNIFICANT CHANGE UP
ORGANISM # SPEC MICROSCOPIC CNT: SIGNIFICANT CHANGE UP
SPECIMEN SOURCE: SIGNIFICANT CHANGE UP
SPECIMEN SOURCE: SIGNIFICANT CHANGE UP

## 2018-12-03 PROCEDURE — 70553 MRI BRAIN STEM W/O & W/DYE: CPT | Mod: 26

## 2018-12-03 RX ORDER — POLYETHYLENE GLYCOL 3350 17 G/17G
17 POWDER, FOR SOLUTION ORAL DAILY
Qty: 0 | Refills: 0 | Status: DISCONTINUED | OUTPATIENT
Start: 2018-12-03 | End: 2018-12-13

## 2018-12-03 RX ADMIN — Medication 30 MILLILITER(S): at 16:53

## 2018-12-03 RX ADMIN — CEFTRIAXONE 100 GRAM(S): 500 INJECTION, POWDER, FOR SOLUTION INTRAMUSCULAR; INTRAVENOUS at 06:05

## 2018-12-03 RX ADMIN — SODIUM CHLORIDE 75 MILLILITER(S): 9 INJECTION INTRAMUSCULAR; INTRAVENOUS; SUBCUTANEOUS at 02:39

## 2018-12-03 RX ADMIN — Medication 250 MILLIGRAM(S): at 06:06

## 2018-12-03 RX ADMIN — Medication 81 MILLIGRAM(S): at 16:55

## 2018-12-03 RX ADMIN — Medication 250 MILLIGRAM(S): at 16:58

## 2018-12-03 NOTE — CONSULT NOTE ADULT - SUBJECTIVE AND OBJECTIVE BOX
HPI:  58 year old male presents today accompanied with his daughter who reports that he has not been himself for the last three days, he appears very weak, confused and has poor appetite, at baseline pt is alert and oriented x 3 and independent, (-) chest pain (-) sob (_) nausea (-) vomiting (-) diarrhea (-) abdomional pain, pt did fall one week ago, his daughter woke up and found him on the floor lying on his back, he did not recall how he got there (01 Dec 2018 20:40)      PAST MEDICAL & SURGICAL HISTORY:  No pertinent past medical history  H/O knee surgery: bilateral      SOCHX:   tobacco,  -  alcohol    FMHX: FA/MO  - contributory       Recent Travel:    Immunizations:    Allergies    No Known Allergies    Intolerances        MEDICATIONS  (STANDING):  aspirin enteric coated 81 milliGRAM(s) Oral daily  cefTRIAXone   IVPB 1 Gram(s) IV Intermittent every 24 hours  sodium chloride 0.9%. 1000 milliLiter(s) (75 mL/Hr) IV Continuous <Continuous>  vancomycin  IVPB 1000 milliGRAM(s) IV Intermittent every 12 hours    MEDICATIONS  (PRN):  acetaminophen   Tablet .. 325 milliGRAM(s) Oral every 4 hours PRN Temp greater or equal to 38C (100.4F)  aluminum hydroxide/magnesium hydroxide/simethicone Suspension 30 milliLiter(s) Oral every 6 hours PRN Dyspepsia  polyethylene glycol 3350 17 Gram(s) Oral daily PRN Constipation      REVIEW OF SYSTEMS:  CONSTITUTIONAL: No fever, weight loss, or fatigue  EYES: No eye pain, visual disturbances, or discharge  ENMT:  No difficulty hearing, tinnitus, vertigo; No sinus or throat pain  NECK: No pain or stiffness  BREASTS: No pain, masses, or nipple discharge  RESPIRATORY: No cough, wheezing, chills or hemoptysis; No shortness of breath  CARDIOVASCULAR: No chest pain, palpitations, dizziness, or leg swelling  GASTROINTESTINAL: No abdominal or epigastric pain. No nausea, vomiting, or hematemesis; No diarrhea or constipation. No melena or hematochezia.  GENITOURINARY: No dysuria, frequency, hematuria, or incontinence  NEUROLOGICAL: No headaches, memory loss, loss of strength, numbness, or tremors  SKIN: No itching, burning, rashes, or lesions   LYMPH NODES: No enlarged glands  ENDOCRINE: No heat or cold intolerance; No hair loss  MUSCULOSKELETAL: No joint pain or swelling; No muscle, back, or extremity pain  PSYCHIATRIC: No depression, anxiety, mood swings, or difficulty sleeping  HEME/LYMPH: No easy bruising, or bleeding gums  ALLERGY AND IMMUNOLOGIC: No hives or eczema    VITAL SIGNS:    T(C): 36.5 (12-03-18 @ 11:59), Max: 37.1 (12-03-18 @ 05:20)  T(F): 97.7 (12-03-18 @ 11:59), Max: 98.8 (12-03-18 @ 05:20)  HR: 69 (12-03-18 @ 11:59) (66 - 78)  BP: 122/75 (12-03-18 @ 11:59) (109/67 - 122/75)  RR: 17 (12-03-18 @ 11:59) (17 - 18)  SpO2: 100% (12-03-18 @ 11:59) (97% - 100%)    PHYSICAL EXAM:    GENERAL: NAD, well-groomed, well-developed  HEAD:  Atraumatic, Normocephalic  EYES: EOMI, PERRLA, conjunctiva and sclera clear  ENMT: No tonsillar erythema, exudates, or enlargement; Moist mucous membranes, Good dentition, No lesions  NECK: Supple, No JVD, Normal thyroid  NERVOUS SYSTEM:  Alert & Oriented X3, Good concentration; Motor Strength 5/5 B/L upper and lower extremities; DTRs 2+ intact and symmetric  CHEST/LUNG: Clear to auscultation bilaterally; No rales, rhonchi, wheezing bilaterally  HEART: Regular rate and rhythm; No murmurs, rubs, or gallops  ABDOMEN: Soft, Nontender, Nondistended; Bowel sounds present  EXTREMITIES:  2+ Peripheral Pulses, No clubbing, cyanosis, or edema  LYMPH: No lymphadenopathy noted  SKIN: No rashes or lesions  BACK: no pressor sore     LABS:                                     Culture Results:   No growth to date. (12-02 @ 12:28)  Culture Results:   >100,000 CFU/ml Escherichia coli (12-02 @ 00:53)  Culture Results:   No growth to date. (12-01 @ 18:47)  Culture Results:   Growth in anaerobic bottle: Gram Positive Cocci in Clusters  "Due to technical problems, Proteus sp. will Not be reported as part of  the BCID panel until further notice"  ***Blood Panel PCR results on this specimen are available  approximately 3 hours after the Gram stain result.***  Gram stain, PCR, and/or culture results may not always  correspond due to difference in methodologies.  ************************************************************  This PCR assay was performed using [employer].  The following targets are tested for: Enterococcus,  vancomycin resistant enterococci, Listeria monocytogenes,  coagulase negative staphylococci, S. aureus,  methicillin resistant S. aureus, Streptococcus agalactiae  (Group B), S. pneumoniae, S. pyogenes (Group A),  Acinetobacter baumannii, Enterobacter cloacae, E. coli,  Klebsiella oxytoca, K. pneumoniae, Proteus sp.,  Serratia marcescens, Haemophilus influenzae,  Neisseria meningitidis, Pseudomonas aeruginosa, Candida  albicans, C. glabrata, C krusei, C parapsilosis,  C. tropicalis and the KPC resistance gene. (12-01 @ 18:47)                Radiology: HPI:  58 year old male presents today accompanied with his daughter who reports that he has not been himself for the last three days, he appears very weak, confused and has poor appetite, at baseline pt is alert and oriented x 3 and independent, (-) chest pain (-) sob (_) nausea (-) vomiting (-) diarrhea (-) abdomional pain, pt did fall one week ago, his daughter woke up and found him on the floor lying on his back, he did not recall how he got there (01 Dec 2018 20:40)  this is the third admission since september for similar signs and symptoms   patient is weak   cant walk     PAST MEDICAL & SURGICAL HISTORY:  No pertinent past medical history  H/O knee surgery: bilateral      SOCHX:   ex tobacco,quit 10 years  occasional   -  alcohol    FMHX: FA/MO  non- contributory   worked in [employer] ; let go 2 years ago     Recent Travel:    Immunizations:    Allergies    No Known Allergies    Intolerances        MEDICATIONS  (STANDING):  aspirin enteric coated 81 milliGRAM(s) Oral daily  cefTRIAXone   IVPB 1 Gram(s) IV Intermittent every 24 hours  sodium chloride 0.9%. 1000 milliLiter(s) (75 mL/Hr) IV Continuous <Continuous>  vancomycin  IVPB 1000 milliGRAM(s) IV Intermittent every 12 hours    MEDICATIONS  (PRN):  acetaminophen   Tablet .. 325 milliGRAM(s) Oral every 4 hours PRN Temp greater or equal to 38C (100.4F)  aluminum hydroxide/magnesium hydroxide/simethicone Suspension 30 milliLiter(s) Oral every 6 hours PRN Dyspepsia  polyethylene glycol 3350 17 Gram(s) Oral daily PRN Constipation      REVIEW OF SYSTEMS:  CONSTITUTIONAL: No fever,   has had weight loss, and  fatigue  was using a cane   EYES: No eye pain, visual disturbances, or discharge  ENMT:  No difficulty hearing, tinnitus, vertigo; No sinus or throat pain  NECK: No pain or stiffness  RESPIRATORY: No cough, wheezing, chills or hemoptysis; No shortness of breath  CARDIOVASCULAR: No chest pain, palpitations, dizziness, or leg swelling  GASTROINTESTINAL: No abdominal or epigastric pain. No nausea, vomiting, or hematemesis; No diarrhea or constipation. No melena or hematochezia.  GENITOURINARY: No dysuria, frequency, hematuria, or incontinence  NEUROLOGICAL: No headaches, memory loss, loss of strength, numbness, or tremors  SKIN: No itching, burning, rashes, or lesions   LYMPH NODES: No enlarged glands  ENDOCRINE: No heat or cold intolerance; No hair loss  MUSCULOSKELETAL: No joint pain or swelling; No muscle, back, or extremity pain  just weak   was normal before admission?? per wife   PSYCHIATRIC: No depression, anxiety, mood swings, or difficulty sleeping  HEME/LYMPH: No easy bruising, or bleeding gums  ALLERGY AND IMMUNOLOGIC: No hives or eczema    VITAL SIGNS:    T(C): 36.5 (12-03-18 @ 11:59), Max: 37.1 (12-03-18 @ 05:20)  T(F): 97.7 (12-03-18 @ 11:59), Max: 98.8 (12-03-18 @ 05:20)  HR: 69 (12-03-18 @ 11:59) (66 - 78)  BP: 122/75 (12-03-18 @ 11:59) (109/67 - 122/75)  RR: 17 (12-03-18 @ 11:59) (17 - 18)  SpO2: 100% (12-03-18 @ 11:59) (97% - 100%)    PHYSICAL EXAM:    GENERAL: NAD, well-groomed, well-developed  HEAD:  Atraumatic, Normocephalic  EYES: EOMI, PERRLA, conjunctiva and sclera clear  ENMT:  Moist mucous membranes,   NECK: Supple, No JVD, Normal thyroid  NERVOUS SYSTEM:  Alert & Oriented X3, Good concentration; Motor Strength 4/5 bilateral   CHEST/LUNG: Clear to auscultation bilaterally; No rales, rhonchi, wheezing bilaterally  HEART: Regular rate and rhythm; No murmurs, rubs, or gallops  ABDOMEN: Soft, Nontender, Nondistended; Bowel sounds present  EXTREMITIES:  2+ Peripheral Pulses, No clubbing, cyanosis, or edema  LYMPH: No lymphadenopathy noted  SKIN: No rashes or lesions  BACK: no pressor sore     LABS:                                     Culture Results:   No growth to date. (12-02 @ 12:28)  Culture Results:   >100,000 CFU/ml Escherichia coli (12-02 @ 00:53)  Culture Results:   No growth to date. (12-01 @ 18:47)  Culture Results:   Growth in anaerobic bottle: Gram Positive Cocci in Clusters  "Due to technical problems, Proteus sp. will Not be reported as part of  the BCID panel until further notice"  ***Blood Panel PCR results on this specimen are available  approximately 3 hours after the Gram stain result.***  Gram stain, PCR, and/or culture results may not always  correspond due to difference in methodologies.  ************************************************************  This PCR assay was performed using [employer].  The following targets are tested for: Enterococcus,  vancomycin resistant enterococci, Listeria monocytogenes,  coagulase negative staphylococci, S. aureus,  methicillin resistant S. aureus, Streptococcus agalactiae  (Group B), S. pneumoniae, S. pyogenes (Group A),  Acinetobacter baumannii, Enterobacter cloacae, E. coli,  Klebsiella oxytoca, K. pneumoniae, Proteus sp.,  Serratia marcescens, Haemophilus influenzae,  Neisseria meningitidis, Pseudomonas aeruginosa, Candida  albicans, C. glabrata, C krusei, C parapsilosis,  C. tropicalis and the KPC resistance gene. (12-01 @ 18:47)                Radiology:  < from: CT Lumbar Spine No Cont (12.01.18 @ 15:58) >    IMPRESSION:  Bulging discs L3-S1. No fracture identified.                JEFE BURKETT M.D., ATTENDING RADIOLOGIST  This document has been electronically signed. Dec  1 2018  4:09PM                < end of copied text >    < from: CT Head No Cont (10.25.18 @ 01:08) >    IMPRESSION:  No acute intracranial hemorrhage or mass effect. Mild chronic   microvascular changes.    Chronic frontal outflow sinusitis with opacified sinuses as above.                < end of copied text >

## 2018-12-03 NOTE — CONSULT NOTE ADULT - ASSESSMENT
Subjective Complaints:      Consult requested by ER doctor:                  Attending:     History of Present Illness:  Chief Complaint/Reason for Admission:  History of Present Illness:  HPI:  58 year old male presents today accompanied with his daughter who reports that he has not been himself for the last three days, he appears very weak, confused and has poor appetite, at baseline pt is alert and oriented x 3 and independent, (-) chest pain (-) sob (_) nausea (-) vomiting (-) diarrhea (-) abdomional pain, pt did fall one week ago, his daughter woke up and found him on the floor lying on his back, he did not recall how he got there (01 Dec 2018 20:40)        PAST MEDICAL & SURGICAL HISTORY:  No pertinent past medical history  H/O knee surgery: bilateral  58yMale    MEDICATIONS  (STANDING):  aspirin enteric coated 81 milliGRAM(s) Oral daily  cefTRIAXone   IVPB 1 Gram(s) IV Intermittent every 24 hours  sodium chloride 0.9%. 1000 milliLiter(s) (75 mL/Hr) IV Continuous <Continuous>    MEDICATIONS  (PRN):  acetaminophen   Tablet .. 325 milliGRAM(s) Oral every 4 hours PRN Temp greater or equal to 38C (100.4F)      Allergies    No Known Allergies    Intolerances      FAMILY HISTORY:  No pertinent family history in first degree relatives      REVIEW OF SYSTEMS:  General:  No wt loss, fevers, chills, night sweats  Eyes:  Good vision, no reported pain  ENT:  No sore throat, pain, runny nose, dysphagia  CV:  No pain, palpitatioins, hypo/hypertension  Resp:  No dyspnea, cough, tachypnea, wheezing  GI:  No pain, nausea, vomiting, diarrhea, constipatiion  :  No pain, bleeding, incontinence, nocturia  Muscle:  No pain, weakness  Breast:  No pain, abscess, mass, discharge  Neuro:  No weakness, tingling, memory problems  Psych:  No fatigue, insomnia, mood problems, depression  Endocrine:  No polyuria, polydypsia, cold/heat intolerance  Heme:  No petechiae, ecchymosis, easy bruisability  Skin:  No rash, tattoos, scars, edema      Vital Signs Last 24 Hrs  T(C): 36.8 (02 Dec 2018 17:03), Max: 39.3 (01 Dec 2018 21:05)  T(F): 98.2 (02 Dec 2018 17:03), Max: 102.7 (01 Dec 2018 21:05)  HR: 78 (02 Dec 2018 17:03) (71 - 108)  BP: 109/71 (02 Dec 2018 17:03) (109/68 - 150/77)  BP(mean): --  RR: 18 (02 Dec 2018 17:03) (16 - 19)  SpO2: 99% (02 Dec 2018 17:03) (96% - 99%)    GENERAL PHYSICAL EXAM:  General:  Appears stated age, well-groomed, well-nourished, no distress  HEENT:  NC/AT, patent nares w/ pink mucosa, OP clear w/o lesions, PERRL, EOMI, conjunctivae clear, no thyromegaly, nodules, adenopathy, no JVD  Chest:  Full & symmetric excursion, no increased effort, breath sounds clear  Cardiovascular:  Regular rhythm, S1, S2, no murmur/rub/S3/S4, no carotid/femoral/abdominal bruit, radial/pedal pulses 2+, no edema  Abdomen:  Soft, non-tender, non-distended, normoactive bowel sounds, no HSM  Extremities:  Gait & station:   Digits:   Nails:   Joints, Bones, Muscles:   ROM:   Stability:  Skin:  No rash/erythema/ecchymoses/petechiae/wounds/abscess/warm/dry  Musculoskeletal:  Full ROM in all joints w/o swelling/tenderness/effusion    NEUROLOGICAL EXAM:  HENT:  Normocephalic head; atraumatic head.  Neck supple.  ENT: normal looking.  Mental State:    Alert.  Fully oriented to person, place and date.  Coherent.  Speech clear and intact.  Cooperative.  Responds appropriately.    Cranial Nerves:  II-XII:   Pupils round and reactive to light and accommodation.  Extraocular movements full.  Visual fields full (no homonymous hemianopsia).  Visual acuity wnl.  Facial symmetry intact.  Tongue midline.  Motor Functions:  Moves all extremities.  No pronator drift of UE.  Claps hand well.  Hand  intact bilaterally.  Ambulatory.    Sensory Functions:   Intact to touch and pinprick to face and extremities.    Reflexes:  Deep tendon reflexes normoactive to biceps, knees and ankles.  Babinski absent (present).  Cerebellar Testing:    Finger to nose intact.  Nystagmus absent.  Neurovascular: Carotid auscultation full without bruits.      LABS:                        13.2   7.36  )-----------( 66       ( 01 Dec 2018 13:32 )             37.7     12    142  |  107  |  14  ----------------------------<  99  4.1   |  26  |  1.09    Ca    9.4      01 Dec 2018 13:32    TPro  10.0<H>  /  Alb  3.6  /  TBili  1.3<H>  /  DBili  x   /  AST  28  /  ALT  37  /  AlkPhos  57  12    PT/INR - ( 01 Dec 2018 13:32 )   PT: 13.6 sec;   INR: 1.21 ratio         PTT - ( 01 Dec 2018 13:32 )  PTT:28.2 sec    Urinalysis Basic - ( 01 Dec 2018 16:31 )    Color: Yellow / Appearance: very cloudy / S.015 / pH: x  Gluc: x / Ketone: Negative  / Bili: Negative / Urobili: 4 mg/dL   Blood: x / Protein: 100 mg/dL / Nitrite: Positive   Leuk Esterase: Moderate / RBC: 0-2 /HPF / WBC >50   Sq Epi: x / Non Sq Epi: x / Bacteria: Many        RADIOLOGY & ADDITIONAL STUDIES:      Assessment & Opinion: events noted confused dizziness  vertigo for few days  ct head and c spine noted no  acute path  no seziure  arm leg 4/5 sensory ontact toes down  non focla exam  for  work up     Recommendations:  Brain MRI.  Carotid doppler.  Echocardiogram.  EEG.   DVT prophylaxis as ordered. tsh b12 rpr  asa 81 mg mra brain and mra neck   Medications:
e coli urinary tract infection   pan sensitive E coli in urine   bacteremia is likely a contaminant   mentally not that great   needs neuro work up      third admission in 3 months for similar neuro signs and symptoms   rehab eval   await blood culture ID   today cells   likely need hiv test   await MRI brain   has lost weight though looks good

## 2018-12-03 NOTE — PROGRESS NOTE ADULT - SUBJECTIVE AND OBJECTIVE BOX
Patient is a 58y old  Male who presents with a chief complaint of fever (03 Dec 2018 17:00)      INTERVAL HPI/OVERNIGHT EVENTS:  Feeling better.  More awake.  Neurology notes appreciated.  MRI done.  MEDICATIONS  (STANDING):  aspirin enteric coated 81 milliGRAM(s) Oral daily  cefTRIAXone   IVPB 1 Gram(s) IV Intermittent every 24 hours  sodium chloride 0.9%. 1000 milliLiter(s) (75 mL/Hr) IV Continuous <Continuous>    MEDICATIONS  (PRN):  acetaminophen   Tablet .. 325 milliGRAM(s) Oral every 4 hours PRN Temp greater or equal to 38C (100.4F)  aluminum hydroxide/magnesium hydroxide/simethicone Suspension 30 milliLiter(s) Oral every 6 hours PRN Dyspepsia  polyethylene glycol 3350 17 Gram(s) Oral daily PRN Constipation      Allergies    No Known Allergies    Intolerances        REVIEW OF SYSTEMS:  CONSTITUTIONAL: No fever, weight loss, or fatigue  EYES: No eye pain, visual disturbances, or discharge  ENMT:  No difficulty hearing, tinnitus, vertigo; No sinus or throat pain  NECK: No pain or stiffness  BREASTS: No pain, masses, or nipple discharge  RESPIRATORY: No cough, wheezing, chills or hemoptysis; No shortness of breath  CARDIOVASCULAR: No chest pain, palpitations, dizziness, or leg swelling  GASTROINTESTINAL: No abdominal or epigastric pain. No nausea, vomiting, or hematemesis; No diarrhea or constipation. No melena or hematochezia.  GENITOURINARY: No dysuria, frequency, hematuria, or incontinence  NEUROLOGICAL: No headaches, memory loss, loss of strength, numbness, or tremors  SKIN: No itching, burning, rashes, or lesions   LYMPH NODES: No enlarged glands  ENDOCRINE: No heat or cold intolerance; No hair loss  MUSCULOSKELETAL: No joint pain or swelling; No muscle, back, or extremity pain  PSYCHIATRIC: No depression, anxiety, mood swings, or difficulty sleeping  HEME/LYMPH: No easy bruising, or bleeding gums  ALLERGY AND IMMUNOLOGIC: No hives or eczema    Vital Signs Last 24 Hrs  T(C): 36 (03 Dec 2018 17:08), Max: 37.1 (03 Dec 2018 05:20)  T(F): 96.8 (03 Dec 2018 17:08), Max: 98.8 (03 Dec 2018 05:20)  HR: 67 (03 Dec 2018 17:08) (66 - 71)  BP: 127/78 (03 Dec 2018 17:08) (109/67 - 127/78)  BP(mean): --  RR: 18 (03 Dec 2018 17:08) (17 - 18)  SpO2: 96% (03 Dec 2018 17:08) (96% - 100%)    PHYSICAL EXAM:  GENERAL: NAD, well-groomed, well-developed  HEAD:  Atraumatic, Normocephalic  EYES: EOMI, PERRLA, conjunctiva and sclera clear  ENMT: No tonsillar erythema, exudates, or enlargement; Moist mucous membranes, Good dentition, No lesions  NECK: Supple, No JVD, Normal thyroid  NERVOUS SYSTEM:  Alert & Oriented X3, Good concentration; Motor Strength 5/5 B/L upper and lower extremities; DTRs 2+ intact and symmetric  CHEST/LUNG: Clear to percussion bilaterally; No rales, rhonchi, wheezing, or rubs  HEART: Regular rate and rhythm; No murmurs, rubs, or gallops  ABDOMEN: Soft, Nontender, Nondistended; Bowel sounds present  EXTREMITIES:  2+ Peripheral Pulses, No clubbing, cyanosis, or edema  LYMPH: No lymphadenopathy noted  SKIN: No rashes or lesions    LABS:              CAPILLARY BLOOD GLUCOSE        CULTURES:  Culture Results:   No growth to date. (12-02 @ 12:28)  Culture Results:   >100,000 CFU/ml Escherichia coli (12-02 @ 00:53)  Culture Results:   No growth to date. (12-01 @ 18:47)  Culture Results:   Growth in anaerobic bottle: Coag Negative Staphylococcus  Single set isolate, possible contaminant. Contact  Microbiology if susceptibility testing clinically  indicated.  "Due to technical problems, Proteus sp. will Not be reported as part of  theBCID panel until further notice"  ***Blood Panel PCR results on this specimen are available  approximately 3 hours after the Gram stain result.***  Gram stain, PCR, and/or culture results may not always  correspond due to difference in methodologies.  ************************************************************  This PCR assay was performed using [employer].  The following targets are tested for: Enterococcus,  vancomycin resistant enterococci, Listeria monocytogenes,  coagulase negative staphylococci, S. aureus,  methicillin resistant S. aureus, Streptococcus agalactiae  (Group B), S. pneumoniae, S. pyogenes (Group A),  Acinetobacter baumannii, Enterobacter cloacae, E. coli,  Klebsiella oxytoca, K. pneumoniae, Proteus sp.,  Serratia marcescens, Haemophilus influenzae,  Neisseria meningitidis, Pseudomonas aeruginosa, Candida  albicans, C. glabrata, C krusei, C parapsilosis,  C. tropicalis and the KPC resistance gene. (12-01 @ 18:47)    HEMOGLOBIN A1C:    CHOLESTEROL:        RADIOLOGY & ADDITIONAL TESTS:

## 2018-12-03 NOTE — PROGRESS NOTE ADULT - ASSESSMENT
Subjective Complaints:  Historian:         REVIEW OF SYSTEMS:  Eyes:  Good vision, no reported pain  ENT:  No sore throat, pain, runny nose, dysphagia  CV:  No pain, palpitatioins, hypo/hypertension  Resp:  No dyspnea, cough, tachypnea, wheezing  GI:  No pain, nausea, vomiting, diarrhea, constipatiion  Muscle:  No pain, weakness  Neuro:  No weakness, tingling, memory problems  Psych:  No fatigue, insomnia, mood problems, depression  Endocrine:  No polyuria, polydypsia, cold/heat intolerance    Vital Signs Last 24 Hrs  T(C): 36 (03 Dec 2018 17:08), Max: 37.1 (03 Dec 2018 05:20)  T(F): 96.8 (03 Dec 2018 17:08), Max: 98.8 (03 Dec 2018 05:20)  HR: 67 (03 Dec 2018 17:08) (66 - 71)  BP: 127/78 (03 Dec 2018 17:08) (109/67 - 127/78)  BP(mean): --  RR: 18 (03 Dec 2018 17:08) (17 - 18)  SpO2: 96% (03 Dec 2018 17:08) (96% - 100%)    GENERAL PHYSICAL EXAM:  General:  Appears stated age, well-groomed, well-nourished, no distress  HEENT:  NC/AT, patent nares w/ pink mucosa, OP clear w/o lesions, PERRL, EOMI, conjunctivae clear, no thyromegaly, nodules, adenopathy, no JVD  Chest:  Full & symmetric excursion, no increased effort, breath sounds clear  Cardiovascular:  Regular rhythm, S1, S2, no murmur/rub/S3/S4, no carotid/femoral/abdominal bruit, radial/pedal pulses 2+, no edema  Abdomen:  Soft, non-tender, non-distended, normoactive bowel sounds, no HSM  Extremities:  Gait & station:   Digits:   Nails:   Joints, Bones, Muscles:   ROM:   Stability:  Skin:  No rash/erythema/ecchymoses/petechiae/wounds/abscess/warm/dry  Musculoskeletal:  Full ROM in all joints w/o swelling/tenderness/effusion    NEUROLOGICAL EXAM:  HENT:  Normocephalic head; atraumatic head.  Neck supple.  ENT: normal looking.  Mental State:    Alert.  Fully oriented to person, place and date.  Coherent.  Speech clear and intact.  Cooperative.  Responds appropriately.    Cranial Nerves:  II-XII:   Pupils round and reactive to light and accommodation.  Extraocular movements full.  Visual fields full (no homonymous hemianopsia).  Visual acuity wnl.  Facial symmetry intact.  Tongue midline.  Motor Functions:  Moves all extremities.  No pronator drift of UE.  Claps hand well.  Hand  intact bilaterally.  Ambulatory.    Sensory Functions:   Intact to touch and pinprick to face and extremities.    Reflexes:  Deep tendon reflexes normoactive to biceps, knees and ankles.  Babinski absent (present).  Cerebellar Testing:    Finger to nose intact.  Nystagmus absent.  Neurovascular: Carotid auscultation full without bruits.      LABS:       ass= thomas phillips speech fluent  arm leg 4/5 mri rosalva no acute path for pt tsh b12 rpr will follow no seziure non focla exam           RADIOLOGY & ADDITIONAL STUDIES:        Recommendations:  Brain MRI.  Carotid doppler.  Echocardiogram.  EEG.   DVT prophylaxis as ordered.  Medications:

## 2018-12-04 DIAGNOSIS — B20 HUMAN IMMUNODEFICIENCY VIRUS [HIV] DISEASE: ICD-10-CM

## 2018-12-04 DIAGNOSIS — B59 PNEUMOCYSTOSIS: ICD-10-CM

## 2018-12-04 DIAGNOSIS — N39.0 URINARY TRACT INFECTION, SITE NOT SPECIFIED: ICD-10-CM

## 2018-12-04 LAB
% ALBUMIN: 40.2 % — SIGNIFICANT CHANGE UP
% ALPHA 1: 5 % — SIGNIFICANT CHANGE UP
% ALPHA 2: 15.1 % — SIGNIFICANT CHANGE UP
% BETA: 8.7 % — SIGNIFICANT CHANGE UP
% GAMMA: 31 % — SIGNIFICANT CHANGE UP
4/8 RATIO: 0.08 RATIO — LOW (ref 0.9–3.6)
ABS CD8: 1673 /UL — HIGH (ref 142–740)
ALBUMIN SERPL ELPH-MCNC: 2.8 G/DL — LOW (ref 3.3–5)
ALBUMIN SERPL ELPH-MCNC: 3.6 G/DL — SIGNIFICANT CHANGE UP (ref 3.6–5.5)
ALBUMIN/GLOB SERPL ELPH: 0.7 RATIO — SIGNIFICANT CHANGE UP
ALP SERPL-CCNC: 49 U/L — SIGNIFICANT CHANGE UP (ref 40–120)
ALPHA1 GLOB SERPL ELPH-MCNC: 0.4 G/DL — SIGNIFICANT CHANGE UP (ref 0.1–0.4)
ALPHA2 GLOB SERPL ELPH-MCNC: 1.4 G/DL — HIGH (ref 0.5–1)
ALT FLD-CCNC: 29 U/L — SIGNIFICANT CHANGE UP (ref 12–78)
ANION GAP SERPL CALC-SCNC: 9 MMOL/L — SIGNIFICANT CHANGE UP (ref 5–17)
AST SERPL-CCNC: 34 U/L — SIGNIFICANT CHANGE UP (ref 15–37)
B-GLOBULIN SERPL ELPH-MCNC: 0.8 G/DL — SIGNIFICANT CHANGE UP (ref 0.5–1)
BILIRUB SERPL-MCNC: 0.7 MG/DL — SIGNIFICANT CHANGE UP (ref 0.2–1.2)
BUN SERPL-MCNC: 6 MG/DL — LOW (ref 7–23)
CALCIUM SERPL-MCNC: 8.9 MG/DL — SIGNIFICANT CHANGE UP (ref 8.5–10.1)
CD3 BLASTS SPEC-ACNC: 1892 /UL — HIGH (ref 672–1870)
CD3 BLASTS SPEC-ACNC: 89 % — HIGH (ref 59–83)
CD4 %: 6 % — LOW (ref 30–62)
CD8 %: 79 % — HIGH (ref 12–36)
CHLORIDE SERPL-SCNC: 109 MMOL/L — HIGH (ref 96–108)
CO2 SERPL-SCNC: 23 MMOL/L — SIGNIFICANT CHANGE UP (ref 22–31)
CREAT SERPL-MCNC: 0.79 MG/DL — SIGNIFICANT CHANGE UP (ref 0.5–1.3)
FOLATE RBC-MCNC: 758 NG/ML — SIGNIFICANT CHANGE UP (ref 499–1504)
GAMMA GLOBULIN: 2.8 G/DL — HIGH (ref 0.6–1.6)
GLUCOSE SERPL-MCNC: 79 MG/DL — SIGNIFICANT CHANGE UP (ref 70–99)
HCT VFR BLD CALC: 34.5 % — LOW (ref 39–50)
HCT VFR BLD CALC: 35 % — LOW (ref 39–50)
HGB BLD-MCNC: 12.3 G/DL — LOW (ref 13–17)
HIV 1 & 2 AB SERPL IA.RAPID: REACTIVE
INTERPRETATION SERPL IFE-IMP: SIGNIFICANT CHANGE UP
MCHC RBC-ENTMCNC: 29.5 PG — SIGNIFICANT CHANGE UP (ref 27–34)
MCHC RBC-ENTMCNC: 35.7 GM/DL — SIGNIFICANT CHANGE UP (ref 32–36)
MCV RBC AUTO: 82.7 FL — SIGNIFICANT CHANGE UP (ref 80–100)
NRBC # BLD: 0 /100 WBCS — SIGNIFICANT CHANGE UP (ref 0–0)
PLATELET # BLD AUTO: 74 K/UL — LOW (ref 150–400)
POTASSIUM SERPL-MCNC: 3.3 MMOL/L — LOW (ref 3.5–5.3)
POTASSIUM SERPL-SCNC: 3.3 MMOL/L — LOW (ref 3.5–5.3)
PROT PATTERN SERPL ELPH-IMP: SIGNIFICANT CHANGE UP
PROT SERPL-MCNC: 9 G/DL — HIGH (ref 6–8.3)
PROT SERPL-MCNC: 9 GM/DL — HIGH (ref 6–8.3)
PROT SERPL-MCNC: 9 GM/DL — HIGH (ref 6–8.3)
RBC # BLD: 4.17 M/UL — LOW (ref 4.2–5.8)
RBC # FLD: 14.1 % — SIGNIFICANT CHANGE UP (ref 10.3–14.5)
SODIUM SERPL-SCNC: 141 MMOL/L — SIGNIFICANT CHANGE UP (ref 135–145)
T-CELL CD4 SUBSET PNL BLD: 133 /UL — LOW (ref 489–1457)
TSH SERPL-MCNC: 0.78 UIU/ML — SIGNIFICANT CHANGE UP (ref 0.36–3.74)
VANCOMYCIN TROUGH SERPL-MCNC: 6.1 UG/ML — LOW (ref 10–20)
VIT B12 SERPL-MCNC: 733 PG/ML — SIGNIFICANT CHANGE UP (ref 232–1245)
WBC # BLD: 5.62 K/UL — SIGNIFICANT CHANGE UP (ref 3.8–10.5)
WBC # FLD AUTO: 5.62 K/UL — SIGNIFICANT CHANGE UP (ref 3.8–10.5)

## 2018-12-04 PROCEDURE — 71250 CT THORAX DX C-: CPT | Mod: 26

## 2018-12-04 RX ORDER — POTASSIUM CHLORIDE 20 MEQ
40 PACKET (EA) ORAL EVERY 4 HOURS
Qty: 0 | Refills: 0 | Status: COMPLETED | OUTPATIENT
Start: 2018-12-04 | End: 2018-12-04

## 2018-12-04 RX ADMIN — SODIUM CHLORIDE 75 MILLILITER(S): 9 INJECTION INTRAMUSCULAR; INTRAVENOUS; SUBCUTANEOUS at 06:01

## 2018-12-04 RX ADMIN — Medication 81 MILLIGRAM(S): at 11:30

## 2018-12-04 RX ADMIN — Medication 40 MILLIEQUIVALENT(S): at 14:49

## 2018-12-04 RX ADMIN — CEFTRIAXONE 100 GRAM(S): 500 INJECTION, POWDER, FOR SOLUTION INTRAMUSCULAR; INTRAVENOUS at 06:00

## 2018-12-04 RX ADMIN — Medication 40 MILLIEQUIVALENT(S): at 11:36

## 2018-12-04 RX ADMIN — SODIUM CHLORIDE 75 MILLILITER(S): 9 INJECTION INTRAMUSCULAR; INTRAVENOUS; SUBCUTANEOUS at 11:31

## 2018-12-04 NOTE — PROGRESS NOTE ADULT - ASSESSMENT
Subjective Complaints:  Historian:         REVIEW OF SYSTEMS:  Eyes:  Good vision, no reported pain  ENT:  No sore throat, pain, runny nose, dysphagia  CV:  No pain, palpitatioins, hypo/hypertension  Resp:  No dyspnea, cough, tachypnea, wheezing  GI:  No pain, nausea, vomiting, diarrhea, constipatiion  Muscle:  No pain, weakness  Neuro:  No weakness, tingling, memory problems  Psych:  No fatigue, insomnia, mood problems, depression  Endocrine:  No polyuria, polydypsia, cold/heat intolerance    Vital Signs Last 24 Hrs  T(C): 36.8 (04 Dec 2018 17:54), Max: 36.8 (04 Dec 2018 11:02)  T(F): 98.2 (04 Dec 2018 17:54), Max: 98.3 (04 Dec 2018 11:02)  HR: 88 (04 Dec 2018 17:54) (63 - 88)  BP: 100/75 (04 Dec 2018 17:54) (100/75 - 134/83)  BP(mean): --  RR: 17 (04 Dec 2018 17:54) (17 - 18)  SpO2: 98% (04 Dec 2018 17:54) (95% - 98%)    GENERAL PHYSICAL EXAM:  General:  Appears stated age, well-groomed, well-nourished, no distress  HEENT:  NC/AT, patent nares w/ pink mucosa, OP clear w/o lesions, PERRL, EOMI, conjunctivae clear, no thyromegaly, nodules, adenopathy, no JVD  Chest:  Full & symmetric excursion, no increased effort, breath sounds clear  Cardiovascular:  Regular rhythm, S1, S2, no murmur/rub/S3/S4, no carotid/femoral/abdominal bruit, radial/pedal pulses 2+, no edema  Abdomen:  Soft, non-tender, non-distended, normoactive bowel sounds, no HSM  Extremities:  Gait & station:   Digits:   Nails:   Joints, Bones, Muscles:   ROM:   Stability:  Skin:  No rash/erythema/ecchymoses/petechiae/wounds/abscess/warm/dry  Musculoskeletal:  Full ROM in all joints w/o swelling/tenderness/effusion    NEUROLOGICAL EXAM:  HENT:  Normocephalic head; atraumatic head.  Neck supple.  ENT: normal looking.  Mental State:    Alert.  Fully oriented to person, place and date.  Coherent.  Speech clear and intact.  Cooperative.  Responds appropriately.    Cranial Nerves:  II-XII:   Pupils round and reactive to light and accommodation.  Extraocular movements full.  Visual fields full (no homonymous hemianopsia).  Visual acuity wnl.  Facial symmetry intact.  Tongue midline.  Motor Functions:  Moves all extremities.  No pronator drift of UE.  Claps hand well.  Hand  intact bilaterally.  Ambulatory.    Sensory Functions:   Intact to touch and pinprick to face and extremities.    Reflexes:  Deep tendon reflexes normoactive to biceps, knees and ankles.  Babinski absent (present).  Cerebellar Testing:    Finger to nose intact.  Nystagmus absent.  Neurovascular: Carotid auscultation full without bruits.      LABS:                        x      x     )-----------( x        ( 04 Dec 2018 08:08 )             35       12-04    141  |  109<H>  |  6<L>  ----------------------------<  79  3.3<L>   |  23  |  0.79    Ca    8.9      04 Dec 2018 07:13    TPro  9.0<H>  /  Alb  x   /  TBili  x   /  DBili  x   /  AST  x   /  ALT  x   /  AlkPhos  x   12-04     ass= jeramy phillips speech fluent sitting in chair   arm leg 4/5 hx of hiv  mri rosalva no acute  path no klaus  will nereyda       RADIOLOGY & ADDITIONAL STUDIES:        Recommendations:  Brain MRI.  Carotid doppler.  Echocardiogram.  EEG.   DVT prophylaxis as ordered.  Medications:

## 2018-12-04 NOTE — PHYSICAL THERAPY INITIAL EVALUATION ADULT - PERTINENT HX OF CURRENT PROBLEM, REHAB EVAL
Patient admitted after not being himself for 3 days, weak, confused and with a poor appetite. Patient s/p fall 1 week prior to admission.

## 2018-12-04 NOTE — PROGRESS NOTE ADULT - SUBJECTIVE AND OBJECTIVE BOX
58 year old male presents today accompanied with his daughter who reports that he has not been himself for the last three days, he appears very weak, confused and has poor appetite, at baseline pt is alert and oriented x 3 and independent, (-) chest pain (-) sob (_) nausea (-) vomiting (-) diarrhea (-) abdomional pain, pt did fall one week ago, his daughter woke up and found him on the floor lying on his back, he did not recall how he got there (01 Dec 2018 20:40)  this is the third admission since september for similar signs and symptoms   patient is weak   cant walk   cd4 noted        MEDICATIONS  (STANDING):  aspirin enteric coated 81 milliGRAM(s) Oral daily  cefTRIAXone   IVPB 1 Gram(s) IV Intermittent every 24 hours  sodium chloride 0.9%. 1000 milliLiter(s) (75 mL/Hr) IV Continuous <Continuous>    MEDICATIONS  (PRN):  acetaminophen   Tablet .. 325 milliGRAM(s) Oral every 4 hours PRN Temp greater or equal to 38C (100.4F)  aluminum hydroxide/magnesium hydroxide/simethicone Suspension 30 milliLiter(s) Oral every 6 hours PRN Dyspepsia  polyethylene glycol 3350 17 Gram(s) Oral daily PRN Constipation      REVIEW OF SYSTEMS:    feels ok   sl confusion   doing better    VITAL SIGNS:  T(C): 36.8 (12-04-18 @ 11:02), Max: 36.8 (12-04-18 @ 11:02)  T(F): 98.3 (12-04-18 @ 11:02), Max: 98.3 (12-04-18 @ 11:02)  HR: 78 (12-04-18 @ 15:51) (63 - 82)  BP: 104/72 (12-04-18 @ 15:51) (104/72 - 134/83)  RR: 18 (12-04-18 @ 15:51) (17 - 18)  SpO2: 98% (12-04-18 @ 15:51) (95% - 98%)  Wt(kg): --    PHYSICAL EXAM:    GENERAL: not in any distress  HEENT: Neck is supple, normocephalic, atraumatic   CHEST/LUNG: Clear to auscultation bilaterally; No rales, rhonchi, wheezing  HEART: Regular rate and rhythm; No murmurs, rubs, or gallops  ABDOMEN: Soft, Nontender, Nondistended; Bowel sounds present, no rebound   EXTREMITIES:  2+ Peripheral Pulses, No clubbing, cyanosis, or edema  SKIN: No rashes or lesions  BACK: no pressor sore   NERVOUS SYSTEM:  Alert & Oriented X3, very slow     LABS:                         x      x     )-----------( x        ( 04 Dec 2018 08:08 )             35       12-04    141  |  109<H>  |  6<L>  ----------------------------<  79  3.3<L>   |  23  |  0.79    Ca    8.9      04 Dec 2018 07:13    TPro  9.0<H>  /  Alb  x   /  TBili  x   /  DBili  x   /  AST  x   /  ALT  x   /  AlkPhos  x   12-04    LIVER FUNCTIONS - ( 04 Dec 2018 08:08 )  Alb: x     / Pro: 9.0 gm/dL / ALK PHOS: x     / ALT: x     / AST: x     / GGT: x                     Thyroid Stimulating Hormone, Serum: 0.782 uIU/mL (12-04 @ 07:13)          Vancomycin Level, Trough: 6.1 ug/mL (12-04 @ 07:13)        Culture Results:   No growth to date. (12-02 @ 12:28)  Culture Results:   >100,000 CFU/ml Escherichia coli (12-02 @ 00:53)  Culture Results:   No growth to date. (12-01 @ 18:47)  Culture Results:   Growth in anaerobic bottle: Coag Negative Staphylococcus  Single set isolate, possible contaminant. Contact  Microbiology if susceptibility testing clinically  indicated.  "Due to technical problems, Proteus sp. will Not be reported as part of  theBCID panel until further notice"  ***Blood Panel PCR results on this specimen are available  approximately 3 hours after the Gram stain result.***  Gram stain, PCR, and/or culture results may not always  correspond due to difference in methodologies.  ************************************************************  This PCR assay was performed using [employer].  The following targets are tested for: Enterococcus,  vancomycin resistant enterococci, Listeria monocytogenes,  coagulase negative staphylococci, S. aureus,  methicillin resistant S. aureus, Streptococcus agalactiae  (Group B), S. pneumoniae, S. pyogenes (Group A),  Acinetobacter baumannii, Enterobacter cloacae, E. coli,  Klebsiella oxytoca, K. pneumoniae, Proteus sp.,  Serratia marcescens, Haemophilus influenzae,  Neisseria meningitidis, Pseudomonas aeruginosa, Candida  albicans, C. glabrata, C krusei, C parapsilosis,  C. tropicalis and the KPC resistance gene. (12-01 @ 18:47)                Radiology:

## 2018-12-04 NOTE — PROGRESS NOTE ADULT - SUBJECTIVE AND OBJECTIVE BOX
Patient is a 58y old  Male who presents with a chief complaint of weakernss dizziness  hx of hiv (04 Dec 2018 21:22)      INTERVAL HPI/OVERNIGHT EVENTS:  Patient improved. ID notes appreciated. HIV TEST REPORT NOTED.  MEDICATIONS  (STANDING):  aspirin enteric coated 81 milliGRAM(s) Oral daily  cefTRIAXone   IVPB 1 Gram(s) IV Intermittent every 24 hours  sodium chloride 0.9%. 1000 milliLiter(s) (75 mL/Hr) IV Continuous <Continuous>  trimethoprim  160 mG/sulfamethoxazole 800 mG 1 Tablet(s) Oral daily    MEDICATIONS  (PRN):  acetaminophen   Tablet .. 325 milliGRAM(s) Oral every 4 hours PRN Temp greater or equal to 38C (100.4F)  aluminum hydroxide/magnesium hydroxide/simethicone Suspension 30 milliLiter(s) Oral every 6 hours PRN Dyspepsia  polyethylene glycol 3350 17 Gram(s) Oral daily PRN Constipation      Allergies    No Known Allergies    Intolerances        REVIEW OF SYSTEMS:  CONSTITUTIONAL: No fever, weight loss, or fatigue  EYES: No eye pain, visual disturbances, or discharge  ENMT:  No difficulty hearing, tinnitus, vertigo; No sinus or throat pain  NECK: No pain or stiffness  BREASTS: No pain, masses, or nipple discharge  RESPIRATORY: No cough, wheezing, chills or hemoptysis; No shortness of breath  CARDIOVASCULAR: No chest pain, palpitations, dizziness, or leg swelling  GASTROINTESTINAL: No abdominal or epigastric pain. No nausea, vomiting, or hematemesis; No diarrhea or constipation. No melena or hematochezia.  GENITOURINARY: No dysuria, frequency, hematuria, or incontinence  NEUROLOGICAL: No headaches, memory loss, loss of strength, numbness, or tremors  SKIN: No itching, burning, rashes, or lesions   LYMPH NODES: No enlarged glands  ENDOCRINE: No heat or cold intolerance; No hair loss  MUSCULOSKELETAL: No joint pain or swelling; No muscle, back, or extremity pain  PSYCHIATRIC: No depression, anxiety, mood swings, or difficulty sleeping  HEME/LYMPH: No easy bruising, or bleeding gums  ALLERGY AND IMMUNOLOGIC: No hives or eczema    Vital Signs Last 24 Hrs  T(C): 36.8 (04 Dec 2018 17:54), Max: 36.8 (04 Dec 2018 11:02)  T(F): 98.2 (04 Dec 2018 17:54), Max: 98.3 (04 Dec 2018 11:02)  HR: 88 (04 Dec 2018 17:54) (63 - 88)  BP: 100/75 (04 Dec 2018 17:54) (100/75 - 134/83)  BP(mean): --  RR: 17 (04 Dec 2018 17:54) (17 - 18)  SpO2: 98% (04 Dec 2018 17:54) (95% - 98%)    PHYSICAL EXAM:  GENERAL: NAD, well-groomed, well-developed  HEAD:  Atraumatic, Normocephalic  EYES: EOMI, PERRLA, conjunctiva and sclera clear  ENMT: No tonsillar erythema, exudates, or enlargement; Moist mucous membranes, Good dentition, No lesions  NECK: Supple, No JVD, Normal thyroid  NERVOUS SYSTEM:  Alert & Oriented X3, Good concentration; Motor Strength 5/5 B/L upper and lower extremities; DTRs 2+ intact and symmetric  CHEST/LUNG: Clear to percussion bilaterally; No rales, rhonchi, wheezing, or rubs  HEART: Regular rate and rhythm; No murmurs, rubs, or gallops  ABDOMEN: Soft, Nontender, Nondistended; Bowel sounds present  EXTREMITIES:  2+ Peripheral Pulses, No clubbing, cyanosis, or edema  LYMPH: No lymphadenopathy noted  SKIN: No rashes or lesions    LABS:                        x      x     )-----------( x        ( 04 Dec 2018 08:08 )             35       12-04    141  |  109<H>  |  6<L>  ----------------------------<  79  3.3<L>   |  23  |  0.79    Ca    8.9      04 Dec 2018 07:13    TPro  9.0<H>  /  Alb  x   /  TBili  x   /  DBili  x   /  AST  x   /  ALT  x   /  AlkPhos  x   12-04        CAPILLARY BLOOD GLUCOSE        CULTURES:  Culture Results:   No growth to date. (12-02 @ 12:28)  Culture Results:   >100,000 CFU/ml Escherichia coli (12-02 @ 00:53)    HEMOGLOBIN A1C:    CHOLESTEROL:        RADIOLOGY & ADDITIONAL TESTS:

## 2018-12-04 NOTE — PHYSICAL THERAPY INITIAL EVALUATION ADULT - CRITERIA FOR SKILLED THERAPEUTIC INTERVENTIONS
rehab potential/therapy frequency/functional limitations in following categories/predicted duration of therapy intervention/impairments found/anticipated discharge recommendation/risk reduction/prevention

## 2018-12-04 NOTE — PROGRESS NOTE ADULT - ASSESSMENT
aids   recurrent falls   hiv dementia ?/   neuro to address sensitive  prophylactic bactrim started aids   recurrent falls   hiv dementia ?/   neuro to address cognitive issues   prophylactic bactrim started

## 2018-12-04 NOTE — PHYSICAL THERAPY INITIAL EVALUATION ADULT - ADDITIONAL COMMENTS
Patient lives in an apartment with no steps to enter, on the 1st floor. Patient used cane prior to admission.

## 2018-12-05 LAB
ANION GAP SERPL CALC-SCNC: 8 MMOL/L — SIGNIFICANT CHANGE UP (ref 5–17)
BUN SERPL-MCNC: 7 MG/DL — SIGNIFICANT CHANGE UP (ref 7–23)
CALCIUM SERPL-MCNC: 9 MG/DL — SIGNIFICANT CHANGE UP (ref 8.5–10.1)
CHLORIDE SERPL-SCNC: 108 MMOL/L — SIGNIFICANT CHANGE UP (ref 96–108)
CO2 SERPL-SCNC: 24 MMOL/L — SIGNIFICANT CHANGE UP (ref 22–31)
CREAT SERPL-MCNC: 0.73 MG/DL — SIGNIFICANT CHANGE UP (ref 0.5–1.3)
GLUCOSE SERPL-MCNC: 78 MG/DL — SIGNIFICANT CHANGE UP (ref 70–99)
HCT VFR BLD CALC: 35.6 % — LOW (ref 39–50)
HGB BLD-MCNC: 12.5 G/DL — LOW (ref 13–17)
HIV 1+2 AB+HIV1 P24 AG SERPL QL IA: REACTIVE
HIV1+2 AB SPEC QL: ABNORMAL
HIV1+2 AB SPEC QL: SIGNIFICANT CHANGE UP
MCHC RBC-ENTMCNC: 28.9 PG — SIGNIFICANT CHANGE UP (ref 27–34)
MCHC RBC-ENTMCNC: 35.1 GM/DL — SIGNIFICANT CHANGE UP (ref 32–36)
MCV RBC AUTO: 82.2 FL — SIGNIFICANT CHANGE UP (ref 80–100)
NRBC # BLD: 0 /100 WBCS — SIGNIFICANT CHANGE UP (ref 0–0)
PLATELET # BLD AUTO: 78 K/UL — LOW (ref 150–400)
POTASSIUM SERPL-MCNC: 3.6 MMOL/L — SIGNIFICANT CHANGE UP (ref 3.5–5.3)
POTASSIUM SERPL-SCNC: 3.6 MMOL/L — SIGNIFICANT CHANGE UP (ref 3.5–5.3)
RBC # BLD: 4.33 M/UL — SIGNIFICANT CHANGE UP (ref 4.2–5.8)
RBC # FLD: 14 % — SIGNIFICANT CHANGE UP (ref 10.3–14.5)
SODIUM SERPL-SCNC: 140 MMOL/L — SIGNIFICANT CHANGE UP (ref 135–145)
WBC # BLD: 7.48 K/UL — SIGNIFICANT CHANGE UP (ref 3.8–10.5)
WBC # FLD AUTO: 7.48 K/UL — SIGNIFICANT CHANGE UP (ref 3.8–10.5)

## 2018-12-05 RX ADMIN — Medication 30 MILLILITER(S): at 22:42

## 2018-12-05 RX ADMIN — SODIUM CHLORIDE 75 MILLILITER(S): 9 INJECTION INTRAMUSCULAR; INTRAVENOUS; SUBCUTANEOUS at 06:02

## 2018-12-05 RX ADMIN — Medication 81 MILLIGRAM(S): at 11:40

## 2018-12-05 RX ADMIN — CEFTRIAXONE 100 GRAM(S): 500 INJECTION, POWDER, FOR SOLUTION INTRAMUSCULAR; INTRAVENOUS at 06:01

## 2018-12-05 RX ADMIN — Medication 1 TABLET(S): at 06:01

## 2018-12-05 NOTE — PROGRESS NOTE ADULT - SUBJECTIVE AND OBJECTIVE BOX
58 year old male presents today accompanied with his daughter who reports that he has not been himself for the last three days, he appears very weak, confused and has poor appetite, at baseline pt is alert and oriented x 3 and independent, (-) chest pain (-) sob (_) nausea (-) vomiting (-) diarrhea (-) abdomional pain, pt did fall one week ago, his daughter woke up and found him on the floor lying on his back, he did not recall how he got there (01 Dec 2018 20:40)      Allergies    No Known Allergies    Intolerances        MEDICATIONS  (STANDING):  aspirin enteric coated 81 milliGRAM(s) Oral daily  cefTRIAXone   IVPB 1 Gram(s) IV Intermittent every 24 hours  sodium chloride 0.9%. 1000 milliLiter(s) (75 mL/Hr) IV Continuous <Continuous>  trimethoprim  160 mG/sulfamethoxazole 800 mG 1 Tablet(s) Oral daily    MEDICATIONS  (PRN):  acetaminophen   Tablet .. 325 milliGRAM(s) Oral every 4 hours PRN Temp greater or equal to 38C (100.4F)  aluminum hydroxide/magnesium hydroxide/simethicone Suspension 30 milliLiter(s) Oral every 6 hours PRN Dyspepsia  polyethylene glycol 3350 17 Gram(s) Oral daily PRN Constipation      REVIEW OF SYSTEMS:    CONSTITUTIONAL: No fever, chills, weight loss, or fatigue  HEENT: No sore throat, runny nose, ear ache  RESPIRATORY: No cough, wheezing, No shortness of breath  CARDIOVASCULAR: No chest pain, palpitations, dizziness  GASTROINTESTINAL: No abdominal pain. No nausea, vomiting, diarrhea  GENITOURINARY: No dysuria, increase frequency, hematuria, or incontinence  NEUROLOGICAL: No headaches, memory loss, loss of strength, numbness, or tremors, no weakness  EXTREMITY: No pedal edema BLE  SKIN: No itching, burning, rashes, or lesions     VITAL SIGNS:  T(C): 37.1 (12-05-18 @ 11:00), Max: 37.1 (12-05-18 @ 11:00)  T(F): 98.8 (12-05-18 @ 11:00), Max: 98.8 (12-05-18 @ 11:00)  HR: 88 (12-05-18 @ 11:00) (68 - 88)  BP: 131/84 (12-05-18 @ 11:00) (100/75 - 131/84)  RR: 17 (12-05-18 @ 11:00) (17 - 17)  SpO2: 97% (12-05-18 @ 11:00) (97% - 99%)  Wt(kg): --    PHYSICAL EXAM:    GENERAL: not in any distress  HEENT: Neck is supple, normocephalic, atraumatic   CHEST/LUNG: Clear to auscultation bilaterally; No rales, rhonchi, wheezing  HEART: Regular rate and rhythm; No murmurs, rubs, or gallops  ABDOMEN: Soft, Nontender, Nondistended; Bowel sounds present, no rebound   EXTREMITIES:  2+ Peripheral Pulses, No clubbing, cyanosis, or edema  GENITOURINARY:   SKIN: No rashes or lesions  BACK: no pressor sore   NERVOUS SYSTEM:  Alert & Oriented X3, Good concentration  PSYCH: normal affect     LABS:                         12.5   7.48  )-----------( 78       ( 05 Dec 2018 07:23 )             35.6     12-05    140  |  108  |  7   ----------------------------<  78  3.6   |  24  |  0.73    Ca    9.0      05 Dec 2018 07:23    TPro  9.0<H>  /  Alb  3.6  /  TBili  x   /  DBili  x   /  AST  x   /  ALT  x   /  AlkPhos  x   12-04    LIVER FUNCTIONS - ( 04 Dec 2018 08:08 )  Alb: 3.6 g/dL / Pro: 9.0 gm/dL / ALK PHOS: x     / ALT: x     / AST: x     / GGT: x                     Thyroid Stimulating Hormone, Serum: 0.782 uIU/mL (12-04 @ 07:13)      Rapid HIV-1/2 Antibody: Reactive (12-04 @ 18:58)      Vancomycin Level, Trough: 6.1 ug/mL (12-04 @ 07:13)        Culture Results:   No growth to date. (12-02 @ 12:28)  Culture Results:   >100,000 CFU/ml Escherichia coli (12-02 @ 00:53)  Culture Results:   No growth to date. (12-01 @ 18:47)  Culture Results:   Growth in anaerobic bottle: Coag Negative Staphylococcus  Single set isolate, possible contaminant. Contact  Microbiology if susceptibility testing clinically  indicated.  "Due to technical problems, Proteus sp. will Not be reported as part of  theBCID panel until further notice"  ***Blood Panel PCR results on this specimen are available  approximately 3 hours after the Gram stain result.***  Gram stain, PCR, and/or culture results may not always  correspond due to difference in methodologies.  ************************************************************  This PCR assay was performed using [employer].  The following targets are tested for: Enterococcus,  vancomycin resistant enterococci, Listeria monocytogenes,  coagulase negative staphylococci, S. aureus,  methicillin resistant S. aureus, Streptococcus agalactiae  (Group B), S. pneumoniae, S. pyogenes (Group A),  Acinetobacter baumannii, Enterobacter cloacae, E. coli,  Klebsiella oxytoca, K. pneumoniae, Proteus sp.,  Serratia marcescens, Haemophilus influenzae,  Neisseria meningitidis, Pseudomonas aeruginosa, Candida  albicans, C. glabrata, C krusei, C parapsilosis,  C. tropicalis and the KPC resistance gene. (12-01 @ 18:47)                Radiology: 58 year old male presents today accompanied with his daughter who reports that he has not been himself for the last three days, he appears very weak, confused and has poor appetite, at baseline pt is alert and oriented x 3 and independent, (-) chest pain (-) sob (_) nausea (-) vomiting (-) diarrhea (-) abdomional pain, pt did fall one week ago, his daughter woke up and found him on the floor lying on his back, he did not recall how he got there (01 Dec 2018 20:40)  third admission since september   sufficient cognitive deficit     Allergies    No Known Allergies    Intolerances        MEDICATIONS  (STANDING):  aspirin enteric coated 81 milliGRAM(s) Oral daily  cefTRIAXone   IVPB 1 Gram(s) IV Intermittent every 24 hours  sodium chloride 0.9%. 1000 milliLiter(s) (75 mL/Hr) IV Continuous <Continuous>  trimethoprim  160 mG/sulfamethoxazole 800 mG 1 Tablet(s) Oral daily    MEDICATIONS  (PRN):  acetaminophen   Tablet .. 325 milliGRAM(s) Oral every 4 hours PRN Temp greater or equal to 38C (100.4F)  aluminum hydroxide/magnesium hydroxide/simethicone Suspension 30 milliLiter(s) Oral every 6 hours PRN Dyspepsia  polyethylene glycol 3350 17 Gram(s) Oral daily PRN Constipation      REVIEW OF SYSTEMS:    CONSTITUTIONAL: No fever, chills, weight loss, or fatigue  HEENT: No sore throat, runny nose, ear ache  RESPIRATORY: No cough, wheezing, No shortness of breath  CARDIOVASCULAR: No chest pain, palpitations, dizziness  GASTROINTESTINAL: No abdominal pain. No nausea, vomiting, diarrhea  GENITOURINARY: No dysuria, increase frequency, hematuria, or incontinence  NEUROLOGICAL: No headaches, memory loss, loss of strength, numbness, or tremors, no weakness  EXTREMITY: No pedal edema BLE  SKIN: No itching, burning, rashes, or lesions     VITAL SIGNS:  T(C): 37.1 (12-05-18 @ 11:00), Max: 37.1 (12-05-18 @ 11:00)  T(F): 98.8 (12-05-18 @ 11:00), Max: 98.8 (12-05-18 @ 11:00)  HR: 88 (12-05-18 @ 11:00) (68 - 88)  BP: 131/84 (12-05-18 @ 11:00) (100/75 - 131/84)  RR: 17 (12-05-18 @ 11:00) (17 - 17)  SpO2: 97% (12-05-18 @ 11:00) (97% - 99%)  Wt(kg): --    PHYSICAL EXAM:    GENERAL: not in any distress  HEENT: Neck is supple, normocephalic, atraumatic   CHEST/LUNG: Clear to auscultation bilaterally; No rales, rhonchi, wheezing  HEART: Regular rate and rhythm; No murmurs, rubs, or gallops  ABDOMEN: Soft, Nontender, Nondistended; Bowel sounds present, no rebound   EXTREMITIES:  2+ Peripheral Pulses, No clubbing, cyanosis, or edema  SKIN: No rashes or lesions  BACK: no pressor sore   NERVOUS SYSTEM:  Alert & Oriented X3,  LABS:                         12.5   7.48  )-----------( 78       ( 05 Dec 2018 07:23 )             35.6     12-05    140  |  108  |  7   ----------------------------<  78  3.6   |  24  |  0.73    Ca    9.0      05 Dec 2018 07:23    TPro  9.0<H>  /  Alb  3.6  /  TBili  x   /  DBili  x   /  AST  x   /  ALT  x   /  AlkPhos  x   12-04    LIVER FUNCTIONS - ( 04 Dec 2018 08:08 )  Alb: 3.6 g/dL / Pro: 9.0 gm/dL / ALK PHOS: x     / ALT: x     / AST: x     / GGT: x                     Thyroid Stimulating Hormone, Serum: 0.782 uIU/mL (12-04 @ 07:13)      Rapid HIV-1/2 Antibody: Reactive (12-04 @ 18:58)      Vancomycin Level, Trough: 6.1 ug/mL (12-04 @ 07:13)        Culture Results:   No growth to date. (12-02 @ 12:28)  Culture Results:   >100,000 CFU/ml Escherichia coli (12-02 @ 00:53)  Culture Results:   No growth to date. (12-01 @ 18:47)  Culture Results:   Growth in anaerobic bottle: Coag Negative Staphylococcus  Single set isolate, possible contaminant. Contact  Microbiology if susceptibility testing clinically  indicated.  "Due to technical problems, Proteus sp. will Not be reported as part of  theBCID panel until further notice"  ***Blood Panel PCR results on this specimen are available  approximately 3 hours after the Gram stain result.***  Gram stain, PCR, and/or culture results may not always  correspond due to difference in methodologies.  ************************************************************  This PCR assay was performed using [employer].  The following targets are tested for: Enterococcus,  vancomycin resistant enterococci, Listeria monocytogenes,  coagulase negative staphylococci, S. aureus,  methicillin resistant S. aureus, Streptococcus agalactiae  (Group B), S. pneumoniae, S. pyogenes (Group A),  Acinetobacter baumannii, Enterobacter cloacae, E. coli,  Klebsiella oxytoca, K. pneumoniae, Proteus sp.,  Serratia marcescens, Haemophilus influenzae,  Neisseria meningitidis, Pseudomonas aeruginosa, Candida  albicans, C. glabrata, C krusei, C parapsilosis,  C. tropicalis and the KPC resistance gene. (12-01 @ 18:47)                Radiology:

## 2018-12-05 NOTE — PROGRESS NOTE ADULT - ASSESSMENT
aids   recurrent falls   hiv dementia ?/   neuro to address cognitive issues   prophylactic bactrim started aids   recurrent falls   hiv dementia ?/   neuro to address cognitive issues   prophylactic bactrim started yesterday   need to  speak to wife   i am unsure he understands   was not surprised by diagnosis  says  10 years ; knew 10 women before marriage   says my wife is HIV pos and she told him that last year  will try again tomorrow in presence of wife

## 2018-12-05 NOTE — PROGRESS NOTE ADULT - SUBJECTIVE AND OBJECTIVE BOX
Patient is a 58y old  Male who presents with a chief complaint of fall (05 Dec 2018 16:54)      INTERVAL HPI/OVERNIGHT EVENTS:  HIV test positive.  ID NOTES appreciated.  MEDICATIONS  (STANDING):  aspirin enteric coated 81 milliGRAM(s) Oral daily  cefTRIAXone   IVPB 1 Gram(s) IV Intermittent every 24 hours  sodium chloride 0.9%. 1000 milliLiter(s) (75 mL/Hr) IV Continuous <Continuous>  trimethoprim  160 mG/ sulfamethoxazole 800 mG 1 Tablet(s) Oral daily    MEDICATIONS  (PRN):  acetaminophen   Tablet .. 325 milliGRAM(s) Oral every 4 hours PRN Temp greater or equal to 38C (100.4F)  aluminum hydroxide/magnesium hydroxide/simethicone Suspension 30 milliLiter(s) Oral every 6 hours PRN Dyspepsia  polyethylene glycol 3350 17 Gram(s) Oral daily PRN Constipation      Allergies    No Known Allergies    Intolerances        REVIEW OF SYSTEMS:  CONSTITUTIONAL: No fever, weight loss, or fatigue  EYES: No eye pain, visual disturbances, or discharge  ENMT:  No difficulty hearing, tinnitus, vertigo; No sinus or throat pain  NECK: No pain or stiffness  BREASTS: No pain, masses, or nipple discharge  RESPIRATORY: No cough, wheezing, chills or hemoptysis; No shortness of breath  CARDIOVASCULAR: No chest pain, palpitations, dizziness, or leg swelling  GASTROINTESTINAL: No abdominal or epigastric pain. No nausea, vomiting, or hematemesis; No diarrhea or constipation. No melena or hematochezia.  GENITOURINARY: No dysuria, frequency, hematuria, or incontinence  NEUROLOGICAL: No headaches, memory loss, loss of strength, numbness, or tremors  SKIN: No itching, burning, rashes, or lesions   LYMPH NODES: No enlarged glands  ENDOCRINE: No heat or cold intolerance; No hair loss  MUSCULOSKELETAL: No joint pain or swelling; No muscle, back, or extremity pain  PSYCHIATRIC: No depression, anxiety, mood swings, or difficulty sleeping  HEME/LYMPH: No easy bruising, or bleeding gums  ALLERGY AND IMMUNOLOGIC: No hives or eczema    Vital Signs Last 24 Hrs  T(C): 37.1 (05 Dec 2018 17:25), Max: 37.1 (05 Dec 2018 11:00)  T(F): 98.7 (05 Dec 2018 17:25), Max: 98.8 (05 Dec 2018 11:00)  HR: 58 (05 Dec 2018 17:25) (58 - 88)  BP: 103/64 (05 Dec 2018 17:25) (103/64 - 131/84)  BP(mean): --  RR: 18 (05 Dec 2018 17:25) (17 - 18)  SpO2: 95% (05 Dec 2018 17:25) (95% - 99%)    PHYSICAL EXAM:  GENERAL: NAD, well-groomed, well-developed  HEAD:  Atraumatic, Normocephalic  EYES: EOMI, PERRLA, conjunctiva and sclera clear  ENMT: No tonsillar erythema, exudates, or enlargement; Moist mucous membranes, Good dentition, No lesions  NECK: Supple, No JVD, Normal thyroid  NERVOUS SYSTEM:  Alert & Oriented X3, Good concentration; Motor Strength 5/5 B/L upper and lower extremities; DTRs 2+ intact and symmetric  CHEST/LUNG: Clear to percussion bilaterally; No rales, rhonchi, wheezing, or rubs  HEART: Regular rate and rhythm; No murmurs, rubs, or gallops  ABDOMEN: Soft, Nontender, Nondistended; Bowel sounds present  EXTREMITIES:  2+ Peripheral Pulses, No clubbing, cyanosis, or edema  LYMPH: No lymphadenopathy noted  SKIN: No rashes or lesions    LABS:                        12.5   7.48  )-----------( 78       ( 05 Dec 2018 07:23 )             35.6     12-05    140  |  108  |  7   ----------------------------<  78  3.6   |  24  |  0.73    Ca    9.0      05 Dec 2018 07:23    TPro  9.0<H>  /  Alb  3.6  /  TBili  x   /  DBili  x   /  AST  x   /  ALT  x   /  AlkPhos  x   12-04        CAPILLARY BLOOD GLUCOSE        CULTURES:    HEMOGLOBIN A1C:    CHOLESTEROL:        RADIOLOGY & ADDITIONAL TESTS:

## 2018-12-06 LAB
ANION GAP SERPL CALC-SCNC: 7 MMOL/L — SIGNIFICANT CHANGE UP (ref 5–17)
BUN SERPL-MCNC: 7 MG/DL — SIGNIFICANT CHANGE UP (ref 7–23)
CALCIUM SERPL-MCNC: 8.8 MG/DL — SIGNIFICANT CHANGE UP (ref 8.5–10.1)
CHLORIDE SERPL-SCNC: 108 MMOL/L — SIGNIFICANT CHANGE UP (ref 96–108)
CO2 SERPL-SCNC: 23 MMOL/L — SIGNIFICANT CHANGE UP (ref 22–31)
CREAT SERPL-MCNC: 0.78 MG/DL — SIGNIFICANT CHANGE UP (ref 0.5–1.3)
CULTURE RESULTS: SIGNIFICANT CHANGE UP
GLUCOSE SERPL-MCNC: 92 MG/DL — SIGNIFICANT CHANGE UP (ref 70–99)
HCT VFR BLD CALC: 34.2 % — LOW (ref 39–50)
HGB BLD-MCNC: 12.3 G/DL — LOW (ref 13–17)
MCHC RBC-ENTMCNC: 29.4 PG — SIGNIFICANT CHANGE UP (ref 27–34)
MCHC RBC-ENTMCNC: 36 GM/DL — SIGNIFICANT CHANGE UP (ref 32–36)
MCV RBC AUTO: 81.6 FL — SIGNIFICANT CHANGE UP (ref 80–100)
NRBC # BLD: 0 /100 WBCS — SIGNIFICANT CHANGE UP (ref 0–0)
PLATELET # BLD AUTO: 106 K/UL — LOW (ref 150–400)
POTASSIUM SERPL-MCNC: 3.6 MMOL/L — SIGNIFICANT CHANGE UP (ref 3.5–5.3)
POTASSIUM SERPL-SCNC: 3.6 MMOL/L — SIGNIFICANT CHANGE UP (ref 3.5–5.3)
RBC # BLD: 4.19 M/UL — LOW (ref 4.2–5.8)
SODIUM SERPL-SCNC: 138 MMOL/L — SIGNIFICANT CHANGE UP (ref 135–145)
SPECIMEN SOURCE: SIGNIFICANT CHANGE UP
T PALLIDUM AB TITR SER: NEGATIVE — SIGNIFICANT CHANGE UP
WBC # BLD: 8.19 K/UL — SIGNIFICANT CHANGE UP (ref 3.8–10.5)
WBC # FLD AUTO: 8.19 K/UL — SIGNIFICANT CHANGE UP (ref 3.8–10.5)

## 2018-12-06 RX ORDER — CEFPODOXIME PROXETIL 100 MG
200 TABLET ORAL EVERY 12 HOURS
Qty: 0 | Refills: 0 | Status: DISCONTINUED | OUTPATIENT
Start: 2018-12-06 | End: 2018-12-13

## 2018-12-06 RX ADMIN — SODIUM CHLORIDE 75 MILLILITER(S): 9 INJECTION INTRAMUSCULAR; INTRAVENOUS; SUBCUTANEOUS at 05:34

## 2018-12-06 RX ADMIN — Medication 1 TABLET(S): at 11:30

## 2018-12-06 RX ADMIN — Medication 200 MILLIGRAM(S): at 17:19

## 2018-12-06 RX ADMIN — Medication 81 MILLIGRAM(S): at 11:30

## 2018-12-06 RX ADMIN — CEFTRIAXONE 100 GRAM(S): 500 INJECTION, POWDER, FOR SOLUTION INTRAMUSCULAR; INTRAVENOUS at 05:34

## 2018-12-06 NOTE — PROGRESS NOTE ADULT - SUBJECTIVE AND OBJECTIVE BOX
HPI:  58 year old male presents today accompanied with his daughter who reports that he has not been himself for the last three days, he appears very weak, confused and has poor appetite, at baseline pt is alert and oriented x 3 and independent, (-) chest pain (-) sob (_) nausea (-) vomiting (-) diarrhea (-) abdomional pain, pt did fall one week ago, his daughter woke up and found him on the floor lying on his back, he did not recall how he got there (01 Dec 2018 20:40)      Allergies    No Known Allergies    Intolerances        MEDICATIONS  (STANDING):  aspirin enteric coated 81 milliGRAM(s) Oral daily  cefTRIAXone   IVPB 1 Gram(s) IV Intermittent every 24 hours  sodium chloride 0.9%. 1000 milliLiter(s) (75 mL/Hr) IV Continuous <Continuous>  trimethoprim  160 mG/sulfamethoxazole 800 mG 1 Tablet(s) Oral daily    MEDICATIONS  (PRN):  acetaminophen   Tablet .. 325 milliGRAM(s) Oral every 4 hours PRN Temp greater or equal to 38C (100.4F)  aluminum hydroxide/magnesium hydroxide/simethicone Suspension 30 milliLiter(s) Oral every 6 hours PRN Dyspepsia  polyethylene glycol 3350 17 Gram(s) Oral daily PRN Constipation      REVIEW OF SYSTEMS:    CONSTITUTIONAL: No fever, chills, weight loss, or fatigue  HEENT: No sore throat, runny nose, ear ache  RESPIRATORY: No cough, wheezing, No shortness of breath  CARDIOVASCULAR: No chest pain, palpitations, dizziness  GASTROINTESTINAL: No abdominal pain. No nausea, vomiting, diarrhea  GENITOURINARY: No dysuria, increase frequency, hematuria, or incontinence  NEUROLOGICAL: No headaches, memory loss, loss of strength, numbness, or tremors, no weakness  EXTREMITY: No pedal edema BLE  SKIN: No itching, burning, rashes, or lesions     VITAL SIGNS:  T(C): 36.9 (12-06-18 @ 11:41), Max: 37.4 (12-06-18 @ 05:00)  T(F): 98.4 (12-06-18 @ 11:41), Max: 99.4 (12-06-18 @ 05:00)  HR: 94 (12-06-18 @ 11:41) (58 - 94)  BP: 102/69 (12-06-18 @ 11:41) (102/69 - 118/70)  RR: 17 (12-06-18 @ 11:41) (16 - 18)  SpO2: 99% (12-06-18 @ 11:41) (95% - 99%)  Wt(kg): --    PHYSICAL EXAM:    GENERAL: not in any distress  HEENT: Neck is supple, normocephalic, atraumatic   CHEST/LUNG: Clear to percussion bilaterally; No rales, rhonchi, wheezing  HEART: Regular rate and rhythm; No murmurs, rubs, or gallops  ABDOMEN: Soft, Nontender, Nondistended; Bowel sounds present, no rebound   EXTREMITIES:  2+ Peripheral Pulses, No clubbing, cyanosis, or edema  GENITOURINARY:   SKIN: No rashes or lesions  BACK: no pressor sore   NERVOUS SYSTEM:  Alert & Oriented X3, Good concentration  PSYCH: normal affect     LABS:                         12.3   8.19  )-----------( 106      ( 06 Dec 2018 08:12 )             34.2     12-06    138  |  108  |  7   ----------------------------<  92  3.6   |  23  |  0.78    Ca    8.8      06 Dec 2018 08:12                  Thyroid Stimulating Hormone, Serum: 0.782 uIU/mL (12-04 @ 07:13)      Rapid HIV-1/2 Antibody: Reactive (12-04 @ 18:58)      Vancomycin Level, Trough: 6.1 ug/mL (12-04 @ 07:13)        Culture Results:   No growth to date. (12-02 @ 12:28)  Culture Results:   >100,000 CFU/ml Escherichia coli (12-02 @ 00:53)  Culture Results:   No growth to date. (12-01 @ 18:47)  Culture Results:   Growth in anaerobic bottle: Coag Negative Staphylococcus  Single set isolate, possible contaminant. Contact  Microbiology if susceptibility testing clinically  indicated.  "Due to technical problems, Proteus sp. will Not be reported as part of  theBCID panel until further notice"  ***Blood Panel PCR results on this specimen are available  approximately 3 hours after the Gram stain result.***  Gram stain, PCR, and/or culture results may not always  correspond due to difference in methodologies.  ************************************************************  This PCR assay was performed using [employer].  The following targets are tested for: Enterococcus,  vancomycin resistant enterococci, Listeria monocytogenes,  coagulase negative staphylococci, S. aureus,  methicillin resistant S. aureus, Streptococcus agalactiae  (Group B), S. pneumoniae, S. pyogenes (Group A),  Acinetobacter baumannii, Enterobacter cloacae, E. coli,  Klebsiella oxytoca, K. pneumoniae, Proteus sp.,  Serratia marcescens, Haemophilus influenzae,  Neisseria meningitidis, Pseudomonas aeruginosa, Candida  albicans, C. glabrata, C krusei, C parapsilosis,  C. tropicalis and the KPC resistance gene. (12-01 @ 18:47)                Radiology:

## 2018-12-06 NOTE — CONSULT NOTE ADULT - SUBJECTIVE AND OBJECTIVE BOX
Patient is a 58y old  Male who presents with a chief complaint of fall (05 Dec 2018 16:54)    HPI:  58 year old male presented  accompanied with his daughter who reports that he has not been himself for  three days, he appeared  very weak, confused and reported to have  poor appetite. At baseline  is alert and oriented x 3 and independent, (-) chest pain (-) sob (_) nausea (-) vomiting (-) diarrhea (-) abdomional pain, pt did fall one week ago, his daughter woke up and found him on the floor lying on his back, he did not recall how he got there . So brought with altered mental status, found to have +ve HIV, E Coli UTI, Bacteremia and possible RLL pneumonia.  not able to provide details of history.     PAST MEDICAL & SURGICAL HISTORY:  No pertinent past medical history  H/O knee surgery: bilateral    FAMILY HISTORY:  No pertinent family history in first degree relatives    SOCIAL HISTORY: not able to provide.    Allergies  No Known Allergies    MEDICATIONS  (STANDING):  aspirin enteric coated 81 milliGRAM(s) Oral daily  cefpodoxime 200 milliGRAM(s) Oral every 12 hours  sodium chloride 0.9%. 1000 milliLiter(s) (75 mL/Hr) IV Continuous <Continuous>  trimethoprim  160 mG/sulfamethoxazole 800 mG 1 Tablet(s) Oral daily    MEDICATIONS  (PRN):  acetaminophen   Tablet .. 325 milliGRAM(s) Oral every 4 hours PRN Temp greater or equal to 38C (100.4F)  aluminum hydroxide/magnesium hydroxide/simethicone Suspension 30 milliLiter(s) Oral every 6 hours PRN Dyspepsia  polyethylene glycol 3350 17 Gram(s) Oral daily PRN Constipation    REVIEW OF SYSTEMS:  not able to provide.    Vital Signs Last 24 Hrs  T(C): 36.9 (06 Dec 2018 11:41), Max: 37.4 (06 Dec 2018 05:00)  T(F): 98.4 (06 Dec 2018 11:41), Max: 99.4 (06 Dec 2018 05:00)  HR: 78 (06 Dec 2018 14:12) (58 - 94)  BP: 110/68 (06 Dec 2018 14:12) (102/69 - 118/70)  BP(mean): --  RR: 16 (06 Dec 2018 14:12) (16 - 18)  SpO2: 98% (06 Dec 2018 14:12) (95% - 99%)    PHYSICAL EXAM:  GEN:         Awake, responsive and comfortable. confused  HEENT:    Normal.    RESP:      no wheezing.  CVS:          Regular rate and rhythm.   ABD:         Soft, non-tender, non-distended;   :             No costovertebral angle tenderness  SKIN:           Warm and dry.  EXTR:            No clubbing, cyanosis or edema  CNS:             confused  PSYCH:        confused    LABS:                        12.3   8.19  )-----------( 106      ( 06 Dec 2018 08:12 )             34.2     12-06    138  |  108  |  7   ----------------------------<  92  3.6   |  23  |  0.78    Ca    8.8      06 Dec 2018 08:12    Culture - Blood (collected 12-02-18 @ 12:28)  Source: .Blood Blood  Preliminary Report (12-03-18 @ 13:02):    No growth to date.    Culture - Urine (collected 12-02-18 @ 00:53)  Source: .Urine Clean Catch (Midstream)  Final Report (12-03-18 @ 17:00):    >100,000 CFU/ml Escherichia coli  Organism: Escherichia coli (12-03-18 @ 17:00)  Organism: Escherichia coli (12-03-18 @ 17:00)      -  Amikacin: S <=8      -  Amoxicillin/Clavulanic Acid: S <=8/4      -  Ampicillin: R >16 These ampicillin results predict results for amoxicillin      -  Ampicillin/Sulbactam: I 16/8      -  Aztreonam: S <=4      -  Cefazolin: S <=2 For uncomplicated UTI with K. pneumoniae, E. coli, or P. mirablis: STEPHANIE <=16 is sensitive and STEPHANIE >=32 is resistant. This also predicts results for oral agents cefaclor, cefdinir, cefpodoxime, cefprozil, cefuroxime axetil, cephalexin and locarbef for uncomplicated UTI. Note that some isolates may be susceptible to these agents while testing resistant to cefazolin.      -  Cefepime: S <=2      -  Cefoxitin: S <=4      -  Ceftriaxone: S <=1 Enterobacter, Citrobacter, and Serratia may develop resistance during prolonged therapy      -  Ciprofloxacin: S <=0.5      -  Ertapenem: S <=0.5      -  Gentamicin: S <=1      -  Imipenem: S <=1      -  Levofloxacin: S <=1      -  Meropenem: S <=1      -  Nitrofurantoin: S <=32 Should not be used to treat pyelonephritis      -  Piperacillin/Tazobactam: S <=8      -  Tigecycline: S <=1      -  Tobramycin: S <=2      -  Trimethoprim/Sulfamethoxazole: R >2/38      Method Type: STEPHANIE    Culture - Blood (collected 12-01-18 @ 18:47)  Source: .Blood Blood-Peripheral  Gram Stain (12-03-18 @ 17:29):    Growth in anaerobic bottle: Gram Positive Cocci in Clusters  Final Report (12-03-18 @ 17:29):    Growth in anaerobic bottle: Coag Negative Staphylococcus    Single set isolate, possible contaminant. Contact    Microbiology if susceptibility testing clinically    indicated.    "Due to technical problems, Proteus sp. will Not be reported as part of    theBCID panel until further notice"    ***Blood Panel PCR results on this specimen are available    approximately 3 hours after the Gram stain result.***    Gram stain, PCR, and/or culture results may not always    correspond due to difference in methodologies.    ************************************************************    This PCR assay was performed using [employer].    The following targets are tested for: Enterococcus,    vancomycin resistant enterococci, Listeria monocytogenes,    coagulase negative staphylococci, S. aureus,    methicillin resistant S. aureus, Streptococcus agalactiae    (Group B), S. pneumoniae, S. pyogenes (Group A),    Acinetobacter baumannii, Enterobacter cloacae, E. coli,    Klebsiella oxytoca, K. pneumoniae, Proteus sp.,    Serratia marcescens, Haemophilus influenzae,    Neisseria meningitidis, Pseudomonas aeruginosa, Candida    albicans, C. glabrata, C krusei, C parapsilosis,    C. tropicalis and the KPC resistance gene.  Organism: Blood Culture PCR (12-03-18 @ 17:29)  Organism: Blood Culture PCR (12-03-18 @ 17:29)      -  Coagulase negative Staphylococcus: Detec      Method Type: PCR    Culture - Blood (collected 12-01-18 @ 18:47)  Source: .Blood Blood-Peripheral  Preliminary Report (12-02-18 @ 19:01):    No growth to date.    EKG: sinus tachycardia    RADIOLOGY & ADDITIONAL STUDIES:  < from: CT Chest No Cont (12.04.18 @ 14:43) >  EXAM:  CT CHEST                          PROCEDURE DATE:  12/04/2018      INTERPRETATION:  CLINICAL INFORMATION: Pneumonia    COMPARISON: None.    PROCEDURE:   CT of the Chest was performed without intravenous contrast.  Sagittal and coronal reformats were performed.    FINDINGS:    CHEST:     LUNGS AND LARGE AIRWAYS: Patent central airways.  Right lower lobe   bronchiectasis. Lung base tree-in-bud opacities. No lung consolidations.  PLEURA: No pleural effusion.  VESSELS: Mild atherosclerotic calcifications of thoracic aorta and   coronary arteries.  HEART: Heart size is normal. No pericardial effusion.  MEDIASTINUM AND EDINSON: No lymphadenopathy.  CHEST WALL AND LOWER NECK: Within normal limits.  VISUALIZED UPPER ABDOMEN: Gallbladdersludge and/or gallstones.   Nonspecific perinephric stranding. Punctate nonobstructive left renal   calculus.  BONES: Spinal degenerative changes.    IMPRESSION:     Lung base tree-in-bud opacities, possibly infectious or inflammatory.  Otherwise no lung consolidations.    MIKA SARABIA M.D., ATTENDING RADIOLOGIST  This document has been electronically signed. Dec  4 2018  2:53PM      ASSESSMENT AND PLAN:  ·	RLL Pneumonia.  ·	UTI with E COLI.  ·	Altered Mental Status.  ·	HIV/AIDS.  ·	Coagulase negative staph bacteremia    Continue antibiotics.  Add Incentive Spirometry.  Neuro followup.

## 2018-12-06 NOTE — PROGRESS NOTE ADULT - ASSESSMENT
AIDS   recurrent falls   UTI   hiv dementia ? cannot communicate well, does not have insight   neuro to address cognitive issues   prophylactic bactrim started   need to  speak to wife , only patient upon exam today, no family around   patient seem not insight upon exam after long discussion about complication and risk of HIV     10 years ; knew 10 women before marriage as per previous notes, he said he will not play around women upon exam today   says my wife is HIV pos and she told him that last year?   vdrl   urine culture with simple ecoli   on Rocephin   will switch to PO vantin

## 2018-12-06 NOTE — PROGRESS NOTE ADULT - SUBJECTIVE AND OBJECTIVE BOX
Patient is a 58y old  Male who presents with a chief complaint of Altered mental status (06 Dec 2018 17:08)      INTERVAL HPI/OVERNIGHT EVENTS:  No new complaints.  Spoke to wife and daughter at patients behest.  MEDICATIONS  (STANDING):  aspirin enteric coated 81 milliGRAM(s) Oral daily  cefpodoxime 200 milliGRAM(s) Oral every 12 hours  sodium chloride 0.9%. 1000 milliLiter(s) (75 mL/Hr) IV Continuous <Continuous>  trimethoprim  160 mG/sulfamethoxazole 800 mG 1 Tablet(s) Oral daily    MEDICATIONS  (PRN):  acetaminophen   Tablet .. 325 milliGRAM(s) Oral every 4 hours PRN Temp greater or equal to 38C (100.4F)  aluminum hydroxide/magnesium hydroxide/simethicone Suspension 30 milliLiter(s) Oral every 6 hours PRN Dyspepsia  polyethylene glycol 3350 17 Gram(s) Oral daily PRN Constipation      Allergies    No Known Allergies    Intolerances        REVIEW OF SYSTEMS:  CONSTITUTIONAL: No fever, weight loss, or fatigue  EYES: No eye pain, visual disturbances, or discharge  ENMT:  No difficulty hearing, tinnitus, vertigo; No sinus or throat pain  NECK: No pain or stiffness  BREASTS: No pain, masses, or nipple discharge  RESPIRATORY: No cough, wheezing, chills or hemoptysis; No shortness of breath  CARDIOVASCULAR: No chest pain, palpitations, dizziness, or leg swelling  GASTROINTESTINAL: No abdominal or epigastric pain. No nausea, vomiting, or hematemesis; No diarrhea or constipation. No melena or hematochezia.  GENITOURINARY: No dysuria, frequency, hematuria, or incontinence  NEUROLOGICAL: No headaches, memory loss, loss of strength, numbness, or tremors  SKIN: No itching, burning, rashes, or lesions   LYMPH NODES: No enlarged glands  ENDOCRINE: No heat or cold intolerance; No hair loss  MUSCULOSKELETAL: No joint pain or swelling; No muscle, back, or extremity pain  PSYCHIATRIC: No depression, anxiety, mood swings, or difficulty sleeping  HEME/LYMPH: No easy bruising, or bleeding gums  ALLERGY AND IMMUNOLOGIC: No hives or eczema    Vital Signs Last 24 Hrs  T(C): 36.9 (06 Dec 2018 11:41), Max: 37.4 (06 Dec 2018 05:00)  T(F): 98.4 (06 Dec 2018 11:41), Max: 99.4 (06 Dec 2018 05:00)  HR: 78 (06 Dec 2018 14:12) (59 - 94)  BP: 110/68 (06 Dec 2018 14:12) (102/69 - 118/70)  BP(mean): --  RR: 16 (06 Dec 2018 14:12) (16 - 17)  SpO2: 98% (06 Dec 2018 14:12) (95% - 99%)    PHYSICAL EXAM:  GENERAL: NAD, well-groomed, well-developed  HEAD:  Atraumatic, Normocephalic  EYES: EOMI, PERRLA, conjunctiva and sclera clear  ENMT: No tonsillar erythema, exudates, or enlargement; Moist mucous membranes, Good dentition, No lesions  NECK: Supple, No JVD, Normal thyroid  NERVOUS SYSTEM:  Alert & Oriented X3, Good concentration; Motor Strength 5/5 B/L upper and lower extremities; DTRs 2+ intact and symmetric  CHEST/LUNG: Clear to percussion bilaterally; No rales, rhonchi, wheezing, or rubs  HEART: Regular rate and rhythm; No murmurs, rubs, or gallops  ABDOMEN: Soft, Nontender, Nondistended; Bowel sounds present  EXTREMITIES:  2+ Peripheral Pulses, No clubbing, cyanosis, or edema  LYMPH: No lymphadenopathy noted  SKIN: No rashes or lesions    LABS:                        12.3   8.19  )-----------( 106      ( 06 Dec 2018 08:12 )             34.2     12-06    138  |  108  |  7   ----------------------------<  92  3.6   |  23  |  0.78    Ca    8.8      06 Dec 2018 08:12          CAPILLARY BLOOD GLUCOSE        CULTURES:    HEMOGLOBIN A1C:    CHOLESTEROL:        RADIOLOGY & ADDITIONAL TESTS:

## 2018-12-07 LAB
ANION GAP SERPL CALC-SCNC: 7 MMOL/L — SIGNIFICANT CHANGE UP (ref 5–17)
BUN SERPL-MCNC: 9 MG/DL — SIGNIFICANT CHANGE UP (ref 7–23)
CALCIUM SERPL-MCNC: 8.7 MG/DL — SIGNIFICANT CHANGE UP (ref 8.5–10.1)
CHLORIDE SERPL-SCNC: 109 MMOL/L — HIGH (ref 96–108)
CO2 SERPL-SCNC: 25 MMOL/L — SIGNIFICANT CHANGE UP (ref 22–31)
CREAT SERPL-MCNC: 0.94 MG/DL — SIGNIFICANT CHANGE UP (ref 0.5–1.3)
CULTURE RESULTS: SIGNIFICANT CHANGE UP
GLUCOSE SERPL-MCNC: 80 MG/DL — SIGNIFICANT CHANGE UP (ref 70–99)
HCT VFR BLD CALC: 34.7 % — LOW (ref 39–50)
HGB BLD-MCNC: 12.2 G/DL — LOW (ref 13–17)
MCHC RBC-ENTMCNC: 29.1 PG — SIGNIFICANT CHANGE UP (ref 27–34)
MCHC RBC-ENTMCNC: 35.2 GM/DL — SIGNIFICANT CHANGE UP (ref 32–36)
MCV RBC AUTO: 82.8 FL — SIGNIFICANT CHANGE UP (ref 80–100)
NRBC # BLD: 0 /100 WBCS — SIGNIFICANT CHANGE UP (ref 0–0)
PLATELET # BLD AUTO: 96 K/UL — LOW (ref 150–400)
POTASSIUM SERPL-MCNC: 4 MMOL/L — SIGNIFICANT CHANGE UP (ref 3.5–5.3)
POTASSIUM SERPL-SCNC: 4 MMOL/L — SIGNIFICANT CHANGE UP (ref 3.5–5.3)
RBC # BLD: 4.19 M/UL — LOW (ref 4.2–5.8)
RBC # FLD: 14.2 % — SIGNIFICANT CHANGE UP (ref 10.3–14.5)
SODIUM SERPL-SCNC: 141 MMOL/L — SIGNIFICANT CHANGE UP (ref 135–145)
SPECIMEN SOURCE: SIGNIFICANT CHANGE UP
WBC # BLD: 7.99 K/UL — SIGNIFICANT CHANGE UP (ref 3.8–10.5)
WBC # FLD AUTO: 7.99 K/UL — SIGNIFICANT CHANGE UP (ref 3.8–10.5)

## 2018-12-07 RX ADMIN — Medication 81 MILLIGRAM(S): at 11:38

## 2018-12-07 RX ADMIN — Medication 1 TABLET(S): at 11:39

## 2018-12-07 RX ADMIN — Medication 200 MILLIGRAM(S): at 17:57

## 2018-12-07 RX ADMIN — SODIUM CHLORIDE 75 MILLILITER(S): 9 INJECTION INTRAMUSCULAR; INTRAVENOUS; SUBCUTANEOUS at 06:55

## 2018-12-07 RX ADMIN — Medication 200 MILLIGRAM(S): at 06:54

## 2018-12-07 NOTE — DISCHARGE NOTE ADULT - CARE PROVIDER_API CALL
Raine Decker), Internal Medicine  [address] [facility] [place] [address]  Phone: [phone]  Fax: [phone]    Farrah Goldman), Infectious Disease; Internal Medicine  [address] [place] [address]  Phone: [phone]  Fax: [phone]

## 2018-12-07 NOTE — PROGRESS NOTE ADULT - SUBJECTIVE AND OBJECTIVE BOX
58 year old male presents today accompanied with his daughter who reports that he has not been himself for the last three days, he appears very weak, confused and has poor appetite, at baseline pt is alert and oriented x 3 and independent, (-) chest pain (-) sob (_) nausea (-) vomiting (-) diarrhea (-) abdomional pain, pt did fall one week ago, his daughter woke up and found him on the floor lying on his back, he did not recall how he got there (01 Dec 2018 20:40)  third admission since september     No Known Allergies    Intolerances        MEDICATIONS  (STANDING):  aspirin enteric coated 81 milliGRAM(s) Oral daily  cefpodoxime 200 milliGRAM(s) Oral every 12 hours  trimethoprim  160 mG/sulfamethoxazole 800 mG 1 Tablet(s) Oral daily    MEDICATIONS  (PRN):  acetaminophen   Tablet .. 325 milliGRAM(s) Oral every 4 hours PRN Temp greater or equal to 38C (100.4F)  aluminum hydroxide/magnesium hydroxide/simethicone Suspension 30 milliLiter(s) Oral every 6 hours PRN Dyspepsia  polyethylene glycol 3350 17 Gram(s) Oral daily PRN Constipation      REVIEW OF SYSTEMS:  has no issues   feels fine   CONSTITUTIONAL: No fever, chills, weight loss, or fatigue  HEENT: No sore throat, runny nose, ear ache  RESPIRATORY: No cough, wheezing, No shortness of breath  CARDIOVASCULAR: No chest pain, palpitations, dizziness  GASTROINTESTINAL: No abdominal pain. No nausea, vomiting, diarrhea  GENITOURINARY: No dysuria, increase frequency, hematuria, or incontinence  NEUROLOGICAL: No headaches, memory loss, loss of strength, numbness, or tremors, no weakness  EXTREMITY: No pedal edema BLE  SKIN: No itching, burning, rashes, or lesions     VITAL SIGNS:  T(C): 36.8 (12-07-18 @ 17:10), Max: 37 (12-06-18 @ 23:50)  T(F): 98.3 (12-07-18 @ 17:10), Max: 98.6 (12-06-18 @ 23:50)  HR: 76 (12-07-18 @ 17:10) (76 - 89)  BP: 96/57 (12-07-18 @ 17:10) (94/61 - 144/66)  RR: 16 (12-07-18 @ 17:10) (16 - 18)  SpO2: 95% (12-07-18 @ 17:10) (95% - 98%)  Wt(kg): --    PHYSICAL EXAM:    GENERAL: not in any distress  no thrush   HEENT: Neck is supple, normocephalic, atraumatic   CHEST/LUNG: Clear to auscultation bilaterally; No rales, rhonchi, wheezing  HEART: Regular rate and rhythm; No murmurs, rubs, or gallops  ABDOMEN: Soft, Nontender, Nondistended; Bowel sounds present, no rebound   EXTREMITIES:  2+ Peripheral Pulses, No clubbing, cyanosis, or edema  SKIN: No rashes or lesions  BACK: no pressor sore   NERVOUS SYSTEM:  Alert & Oriented X 3  LABS:                         12.2   7.99  )-----------( 96       ( 07 Dec 2018 06:21 )             34.7     12-07    141  |  109<H>  |  9   ----------------------------<  80  4.0   |  25  |  0.94    Ca    8.7      07 Dec 2018 06:21                  Thyroid Stimulating Hormone, Serum: 0.782 uIU/mL (12-04 @ 07:13)      Rapid HIV-1/2 Antibody: Reactive (12-04 @ 18:58)            Culture Results:   No growth at 5 days. (12-02 @ 12:28)  Culture Results:   >100,000 CFU/ml Escherichia coli (12-02 @ 00:53)  Culture Results:   No growth at 5 days. (12-01 @ 18:47)  Culture Results:   Growth in anaerobic bottle: Coag Negative Staphylococcus  Single set isolate, possible contaminant. Contact  Microbiology if susceptibility testing clinically  indicated.  "Due to technical problems, Proteus sp. will Not be reported as part of  theBCID panel until further notice"  ***Blood Panel PCR results on this specimen are available  approximately 3 hours after the Gram stain result.***  Gram stain, PCR, and/or culture results may not always  correspond due to difference in methodologies.  ************************************************************  This PCR assay was performed using [employer].  The following targets are tested for: Enterococcus,  vancomycin resistant enterococci, Listeria monocytogenes,  coagulase negative staphylococci, S. aureus,  methicillin resistant S. aureus, Streptococcus agalactiae  (Group B), S. pneumoniae, S. pyogenes (Group A),  Acinetobacter baumannii, Enterobacter cloacae, E. coli,  Klebsiella oxytoca, K. pneumoniae, Proteus sp.,  Serratia marcescens, Haemophilus influenzae,  Neisseria meningitidis, Pseudomonas aeruginosa, Candida  albicans, C. glabrata, C krusei, C parapsilosis,  C. tropicalis and the KPC resistance gene. (12-01 @ 18:47)                Radiology:

## 2018-12-07 NOTE — PROGRESS NOTE ADULT - SUBJECTIVE AND OBJECTIVE BOX
Patient is a 58y old  Male who presents with a chief complaint of Altered mental status (07 Dec 2018 11:35)      INTERVAL HPI/OVERNIGHT EVENTS:  Patient lethargic but arousable.  MEDICATIONS  (STANDING):  aspirin enteric coated 81 milliGRAM(s) Oral daily  cefpodoxime 200 milliGRAM(s) Oral every 12 hours  trimethoprim  160 mG/sulfamethoxazole 800 mG 1 Tablet(s) Oral daily    MEDICATIONS  (PRN):  acetaminophen   Tablet .. 325 milliGRAM(s) Oral every 4 hours PRN Temp greater or equal to 38C (100.4F)  aluminum hydroxide/magnesium hydroxide/simethicone Suspension 30 milliLiter(s) Oral every 6 hours PRN Dyspepsia  polyethylene glycol 3350 17 Gram(s) Oral daily PRN Constipation      Allergies    No Known Allergies    Intolerances        REVIEW OF SYSTEMS:  CONSTITUTIONAL: No fever, weight loss, or fatigue  EYES: No eye pain, visual disturbances, or discharge  ENMT:  No difficulty hearing, tinnitus, vertigo; No sinus or throat pain  NECK: No pain or stiffness  BREASTS: No pain, masses, or nipple discharge  RESPIRATORY: No cough, wheezing, chills or hemoptysis; No shortness of breath  CARDIOVASCULAR: No chest pain, palpitations, dizziness, or leg swelling  GASTROINTESTINAL: No abdominal or epigastric pain. No nausea, vomiting, or hematemesis; No diarrhea or constipation. No melena or hematochezia.  GENITOURINARY: No dysuria, frequency, hematuria, or incontinence  NEUROLOGICAL: No headaches, memory loss, loss of strength, numbness, or tremors  SKIN: No itching, burning, rashes, or lesions   LYMPH NODES: No enlarged glands  ENDOCRINE: No heat or cold intolerance; No hair loss  MUSCULOSKELETAL: No joint pain or swelling; No muscle, back, or extremity pain  PSYCHIATRIC: No depression, anxiety, mood swings, or difficulty sleeping  HEME/LYMPH: No easy bruising, or bleeding gums  ALLERGY AND IMMUNOLOGIC: No hives or eczema    Vital Signs Last 24 Hrs  T(C): 36.4 (07 Dec 2018 11:30), Max: 37 (06 Dec 2018 23:50)  T(F): 97.5 (07 Dec 2018 11:30), Max: 98.6 (06 Dec 2018 23:50)  HR: 84 (07 Dec 2018 11:30) (83 - 89)  BP: 94/61 (07 Dec 2018 11:30) (94/61 - 144/66)  BP(mean): --  RR: 18 (07 Dec 2018 11:30) (17 - 18)  SpO2: 97% (07 Dec 2018 11:30) (97% - 98%)    PHYSICAL EXAM:  GENERAL: NAD, well-groomed, well-developed  HEAD:  Atraumatic, Normocephalic  EYES: EOMI, PERRLA, conjunctiva and sclera clear  ENMT: No tonsillar erythema, exudates, or enlargement; Moist mucous membranes, Good dentition, No lesions  NECK: Supple, No JVD, Normal thyroid  NERVOUS SYSTEM:  Alert & Oriented X3, Good concentration; Motor Strength 5/5 B/L upper and lower extremities; DTRs 2+ intact and symmetric  CHEST/LUNG: Clear to percussion bilaterally; No rales, rhonchi, wheezing, or rubs  HEART: Regular rate and rhythm; No murmurs, rubs, or gallops  ABDOMEN: Soft, Nontender, Nondistended; Bowel sounds present  EXTREMITIES:  2+ Peripheral Pulses, No clubbing, cyanosis, or edema  LYMPH: No lymphadenopathy noted  SKIN: No rashes or lesions    LABS:                        12.2   7.99  )-----------( 96       ( 07 Dec 2018 06:21 )             34.7     12-07    141  |  109<H>  |  9   ----------------------------<  80  4.0   |  25  |  0.94    Ca    8.7      07 Dec 2018 06:21          CAPILLARY BLOOD GLUCOSE        CULTURES:    HEMOGLOBIN A1C:    CHOLESTEROL:        RADIOLOGY & ADDITIONAL TESTS:

## 2018-12-07 NOTE — DISCHARGE NOTE ADULT - MEDICATION SUMMARY - MEDICATIONS TO TAKE
I will START or STAY ON the medications listed below when I get home from the hospital:    acetaminophen 325 mg oral tablet  -- 1 tab(s) by mouth every 4 hours, As needed, Temp greater or equal to 38C (100.4F)  -- Indication: For Pain/fever    aspirin 81 mg oral delayed release tablet  -- 1 tab(s) by mouth once a day  -- Indication: For Preventative    aluminum hydroxide-magnesium hydroxide 200 mg-200 mg/5 mL oral suspension  -- 30 milliliter(s) by mouth every 6 hours, As needed, Dyspepsia  -- Indication: For Antacid    elvitegravir/cobicistat/emtricitabine/tenofovir alafenamide 150 mg-150 mg-200 mg-10 mg oral tablet  -- 1 tab(s) by mouth once a day  -- Indication: For HAART    cefpodoxime 200 mg oral tablet  -- 1 tab(s) by mouth every 12 hours  -- Indication: For PnA    polyethylene glycol 3350 oral powder for reconstitution  -- 17 gram(s) by mouth once a day, As needed, Constipation  -- Indication: For Constipation    sulfamethoxazole-trimethoprim 800 mg-160 mg oral tablet  -- 1 tab(s) by mouth once a day  -- Indication: For HIV I will START or STAY ON the medications listed below when I get home from the hospital:    acetaminophen 325 mg oral tablet  -- 1 tab(s) by mouth every 4 hours, As needed, Temp greater or equal to 38C (100.4F)  -- Indication: For Pain/fever    aspirin 81 mg oral delayed release tablet  -- 1 tab(s) by mouth once a day  -- Indication: For Preventative    aluminum hydroxide-magnesium hydroxide 200 mg-200 mg/5 mL oral suspension  -- 30 milliliter(s) by mouth every 6 hours, As needed, Dyspepsia  -- Indication: For Antacid    elvitegravir/cobicistat/emtricitabine/tenofovir alafenamide 150 mg-150 mg-200 mg-10 mg oral tablet  -- 1 tab(s) by mouth once a day  -- Indication: For HAART    cefpodoxime 200 mg oral tablet  -- 1 tab(s) by mouth every 12 hours  -- Indication: For PnA    zinc oxide 40% topical ointment  -- 1 application on skin every 8 hours, As needed, diaper dermatitis  -- Indication: For Dermatitis    polyethylene glycol 3350 oral powder for reconstitution  -- 17 gram(s) by mouth once a day, As needed, Constipation  -- Indication: For Constipation    lactobacillus acidophilus oral capsule  --  by mouth   -- Indication: For Probiotic    sulfamethoxazole-trimethoprim 800 mg-160 mg oral tablet  -- 1 tab(s) by mouth once a day  -- Indication: For HIV

## 2018-12-07 NOTE — PROGRESS NOTE ADULT - ASSESSMENT
Subjective Complaints:  Historian:         REVIEW OF SYSTEMS:  Eyes:  Good vision, no reported pain  ENT:  No sore throat, pain, runny nose, dysphagia  CV:  No pain, palpitatioins, hypo/hypertension  Resp:  No dyspnea, cough, tachypnea, wheezing  GI:  No pain, nausea, vomiting, diarrhea, constipatiion  Muscle:  No pain, weakness  Neuro:  No weakness, tingling, memory problems  Psych:  No fatigue, insomnia, mood problems, depression  Endocrine:  No polyuria, polydypsia, cold/heat intolerance    Vital Signs Last 24 Hrs  T(C): 36.8 (07 Dec 2018 17:10), Max: 37 (06 Dec 2018 23:50)  T(F): 98.3 (07 Dec 2018 17:10), Max: 98.6 (06 Dec 2018 23:50)  HR: 76 (07 Dec 2018 17:10) (76 - 89)  BP: 96/57 (07 Dec 2018 17:10) (94/61 - 144/66)  BP(mean): --  RR: 16 (07 Dec 2018 17:10) (16 - 18)  SpO2: 95% (07 Dec 2018 17:10) (95% - 98%)    GENERAL PHYSICAL EXAM:  General:  Appears stated age, well-groomed, well-nourished, no distress  HEENT:  NC/AT, patent nares w/ pink mucosa, OP clear w/o lesions, PERRL, EOMI, conjunctivae clear, no thyromegaly, nodules, adenopathy, no JVD  Chest:  Full & symmetric excursion, no increased effort, breath sounds clear  Cardiovascular:  Regular rhythm, S1, S2, no murmur/rub/S3/S4, no carotid/femoral/abdominal bruit, radial/pedal pulses 2+, no edema  Abdomen:  Soft, non-tender, non-distended, normoactive bowel sounds, no HSM  Extremities:  Gait & station:   Digits:   Nails:   Joints, Bones, Muscles:   ROM:   Stability:  Skin:  No rash/erythema/ecchymoses/petechiae/wounds/abscess/warm/dry  Musculoskeletal:  Full ROM in all joints w/o swelling/tenderness/effusion    NEUROLOGICAL EXAM:  HENT:  Normocephalic head; atraumatic head.  Neck supple.  ENT: normal looking.  Mental State:    Alert.  Fully oriented to person, place and date.  Coherent.  Speech clear and intact.  Cooperative.  Responds appropriately.    Cranial Nerves:  II-XII:   Pupils round and reactive to light and accommodation.  Extraocular movements full.  Visual fields full (no homonymous hemianopsia).  Visual acuity wnl.  Facial symmetry intact.  Tongue midline.  Motor Functions:  Moves all extremities.  No pronator drift of UE.  Claps hand well.  Hand  intact bilaterally.  Ambulatory.    Sensory Functions:   Intact to touch and pinprick to face and extremities.    Reflexes:  Deep tendon reflexes normoactive to biceps, knees and ankles.  Babinski absent (present).  Cerebellar Testing:    Finger to nose intact.  Nystagmus absent.  Neurovascular: Carotid auscultation full without bruits.      LABS:                        12.2   7.99  )-----------( 96       ( 07 Dec 2018 06:21 )             34.7     12-07    141  |  109<H>  |  9   ----------------------------<  80  4.0   |  25  |  0.94    Ca    8.7      07 Dec 2018 06:21     ass= pt is sleeping  hx of hiv uti mri brain no acute path   hiv encephalaothy  for rehab  metabolic encephalaothy         RADIOLOGY & ADDITIONAL STUDIES:        Recommendations:  Brain MRI.  Carotid doppler.  Echocardiogram.  EEG.   DVT prophylaxis as ordered.  Medications:

## 2018-12-07 NOTE — PROGRESS NOTE ADULT - SUBJECTIVE AND OBJECTIVE BOX
INTERVAL HPI:  58 year old male presented  accompanied with his daughter who reports that he has not been himself for  three days, he appeared  very weak, confused and reported to have  poor appetite. At baseline  is alert and oriented x 3 and independent, (-) chest pain (-) sob (_) nausea (-) vomiting (-) diarrhea (-) abdomional pain, pt did fall one week ago, his daughter woke up and found him on the floor lying on his back, he did not recall how he got there . So brought with altered mental status, found to have +ve HIV, E Coli UTI, Bacteremia and possible RLL pneumonia.  not able to provide details of history.     OVERNIGHT EVENTS:  Comfortable in bed.    Vital Signs Last 24 Hrs  T(C): 36.8 (07 Dec 2018 17:10), Max: 37 (06 Dec 2018 23:50)  T(F): 98.3 (07 Dec 2018 17:10), Max: 98.6 (06 Dec 2018 23:50)  HR: 76 (07 Dec 2018 17:10) (76 - 89)  BP: 96/57 (07 Dec 2018 17:10) (94/61 - 144/66)  BP(mean): --  RR: 16 (07 Dec 2018 17:10) (16 - 18)  SpO2: 95% (07 Dec 2018 17:10) (95% - 98%)    PHYSICAL EXAM:  GEN:         Awake, responsive and comfortable.  HEENT:    Normal.    RESP:        no wheezing.  CVS:          Regular rate and rhythm.   ABD:         Soft, non-tender, non-distended;     MEDICATIONS  (STANDING):  aspirin enteric coated 81 milliGRAM(s) Oral daily  cefpodoxime 200 milliGRAM(s) Oral every 12 hours  trimethoprim  160 mG/sulfamethoxazole 800 mG 1 Tablet(s) Oral daily    MEDICATIONS  (PRN):  acetaminophen   Tablet .. 325 milliGRAM(s) Oral every 4 hours PRN Temp greater or equal to 38C (100.4F)  aluminum hydroxide/magnesium hydroxide/simethicone Suspension 30 milliLiter(s) Oral every 6 hours PRN Dyspepsia  polyethylene glycol 3350 17 Gram(s) Oral daily PRN Constipation    LABS:                        12.2   7.99  )-----------( 96       ( 07 Dec 2018 06:21 )             34.7     12-07    141  |  109<H>  |  9   ----------------------------<  80  4.0   |  25  |  0.94    Ca    8.7      07 Dec 2018 06:21    ASSESSMENT AND PLAN:  ·	RLL Pneumonia.  ·	UTI with E COLI.  ·	Altered Mental Status.  ·	HIV/AIDS.  ·	Coagulase negative staph bacteremia    Continue antibiotics.  Nutritional support.

## 2018-12-07 NOTE — PROGRESS NOTE ADULT - ASSESSMENT
aids   recurrent falls   hiv dementia ?/   neuro to address cognitive issues   prophylactic bactrim started yesterday   need to  speak to wife ;; called her no [phone] multiple times   recording says she is refusing all calls   i am unsure he understands   patient was not surprised by diagnosis  says  10 years ; knew 10 women before marriage   says my wife is HIV pos and she told him that last year  plan needs o be  discussed with wife   discharge plan on prophyllctic bactrim   needs to start HAART

## 2018-12-07 NOTE — DISCHARGE NOTE ADULT - CARE PLAN
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 Goldman as outpt  Secondary Diagnosis:	Urinary tract infection without hematuria, site unspecified  Assessment and plan of treatment:	Follow up with Dr. Goldman as outpt  Secondary Diagnosis:	AIDS  Assessment and plan of treatment:	Follow up with Dr. Goldman as outpt

## 2018-12-07 NOTE — DISCHARGE NOTE ADULT - HOSPITAL COURSE
58 year old male presents today accompanied with his daughter who reports that he has not been himself for the last three days, he appears very weak, confused and has poor appetite, at baseline pt is alert and oriented x 3 and independent, (-) chest pain (-) sob (_) nausea (-) vomiting (-) diarrhea (-) abdomional pain, pt did fall one week ago, his daughter woke up and found him on the floor lying on his back, he did not recall how he got there. Patient symptoms controlled, medically optimized, and stable for discharge. Patient to follow up with PMD and Dr. Goldman within 1 week.

## 2018-12-07 NOTE — DISCHARGE NOTE ADULT - PATIENT PORTAL LINK FT
You can access the [facility], offered by [facility], by registering with the following website: [email or web address]

## 2018-12-08 LAB
ANION GAP SERPL CALC-SCNC: 6 MMOL/L — SIGNIFICANT CHANGE UP (ref 5–17)
BUN SERPL-MCNC: 9 MG/DL — SIGNIFICANT CHANGE UP (ref 7–23)
CALCIUM SERPL-MCNC: 8.6 MG/DL — SIGNIFICANT CHANGE UP (ref 8.5–10.1)
CHLORIDE SERPL-SCNC: 109 MMOL/L — HIGH (ref 96–108)
CO2 SERPL-SCNC: 24 MMOL/L — SIGNIFICANT CHANGE UP (ref 22–31)
CREAT SERPL-MCNC: 0.9 MG/DL — SIGNIFICANT CHANGE UP (ref 0.5–1.3)
GLUCOSE SERPL-MCNC: 73 MG/DL — SIGNIFICANT CHANGE UP (ref 70–99)
HCT VFR BLD CALC: 34.3 % — LOW (ref 39–50)
HGB BLD-MCNC: 11.9 G/DL — LOW (ref 13–17)
MCHC RBC-ENTMCNC: 28.9 PG — SIGNIFICANT CHANGE UP (ref 27–34)
MCHC RBC-ENTMCNC: 34.7 GM/DL — SIGNIFICANT CHANGE UP (ref 32–36)
MCV RBC AUTO: 83.3 FL — SIGNIFICANT CHANGE UP (ref 80–100)
NRBC # BLD: 0 /100 WBCS — SIGNIFICANT CHANGE UP (ref 0–0)
PLATELET # BLD AUTO: 93 K/UL — LOW (ref 150–400)
POTASSIUM SERPL-MCNC: 4 MMOL/L — SIGNIFICANT CHANGE UP (ref 3.5–5.3)
POTASSIUM SERPL-SCNC: 4 MMOL/L — SIGNIFICANT CHANGE UP (ref 3.5–5.3)
RBC # BLD: 4.12 M/UL — LOW (ref 4.2–5.8)
RBC # FLD: 14.4 % — SIGNIFICANT CHANGE UP (ref 10.3–14.5)
SODIUM SERPL-SCNC: 139 MMOL/L — SIGNIFICANT CHANGE UP (ref 135–145)
WBC # BLD: 7.14 K/UL — SIGNIFICANT CHANGE UP (ref 3.8–10.5)
WBC # FLD AUTO: 7.14 K/UL — SIGNIFICANT CHANGE UP (ref 3.8–10.5)

## 2018-12-08 RX ADMIN — Medication 200 MILLIGRAM(S): at 05:28

## 2018-12-08 RX ADMIN — Medication 81 MILLIGRAM(S): at 11:02

## 2018-12-08 RX ADMIN — Medication 200 MILLIGRAM(S): at 17:08

## 2018-12-08 RX ADMIN — Medication 1 TABLET(S): at 11:02

## 2018-12-08 NOTE — DIETITIAN INITIAL EVALUATION ADULT. - PHYSICAL APPEARANCE
well nourished/BMI = 29, no edema noted, Nutrition focused physical exam conducted ;   Subcutaneous fat loss: [wdl ] Orbital fat pads region, [wdl ]Buccal fat region, [wdl ]Triceps region,  [wdl ]Ribs region.  Muscle wasting: [wdl ]Temples region, [wdl ]Clavicle region, [wdl ]Shoulder region, [wdl ]Scapula region, [wdl ]Interosseous region,  [wdl ]thigh region, [wdl ]Calf region

## 2018-12-08 NOTE — DIETITIAN INITIAL EVALUATION ADULT. - OTHER INFO
Pt seen today due to Length Of Stay. Pt reports fair po intake PTA with no weight loss. Pt alert/slightly confused but able to answer questions. No family at bedside. Pt lives w/ wife whom is also sick and has an aide to tend to her needs. The family members do the food shopping/cooking. Last BM was today. Pt can feed himself but tires easily.  Was consuming 100% of meals when first admitted to hospital but recently p.o. intake has decreased to 50 %.

## 2018-12-08 NOTE — DIETITIAN INITIAL EVALUATION ADULT. - PERTINENT LABORATORY DATA
12-08 Na139 mmol/L Glu 73 mg/dL K+ 4.0 mmol/L Cr  0.90 mg/dL BUN 9 mg/dL 12-04 Alb 3.6 g/dL12-04 ALT 29 U/L AST 34 U/L Alkaline Phosphatase 49 U/L

## 2018-12-08 NOTE — DIETITIAN INITIAL EVALUATION ADULT. - PERTINENT MEDS FT
MEDICATIONS  (STANDING):  aspirin enteric coated 81 milliGRAM(s) Oral daily  cefpodoxime 200 milliGRAM(s) Oral every 12 hours  trimethoprim  160 mG/sulfamethoxazole 800 mG 1 Tablet(s) Oral daily    MEDICATIONS  (PRN):  acetaminophen   Tablet .. 325 milliGRAM(s) Oral every 4 hours PRN Temp greater or equal to 38C (100.4F)  aluminum hydroxide/magnesium hydroxide/simethicone Suspension 30 milliLiter(s) Oral every 6 hours PRN Dyspepsia  polyethylene glycol 3350 17 Gram(s) Oral daily PRN Constipation

## 2018-12-08 NOTE — PROGRESS NOTE ADULT - SUBJECTIVE AND OBJECTIVE BOX
Patient is a 58y old  Male who presents with a chief complaint of falls (07 Dec 2018 20:07)      INTERVAL HPI/OVERNIGHT EVENTS:  No new complaints.  MEDICATIONS  (STANDING):  aspirin enteric coated 81 milliGRAM(s) Oral daily  cefpodoxime 200 milliGRAM(s) Oral every 12 hours  trimethoprim  160 mG/sulfamethoxazole 800 mG 1 Tablet(s) Oral daily    MEDICATIONS  (PRN):  acetaminophen   Tablet .. 325 milliGRAM(s) Oral every 4 hours PRN Temp greater or equal to 38C (100.4F)  aluminum hydroxide/magnesium hydroxide/simethicone Suspension 30 milliLiter(s) Oral every 6 hours PRN Dyspepsia  polyethylene glycol 3350 17 Gram(s) Oral daily PRN Constipation      Allergies    No Known Allergies    Intolerances        REVIEW OF SYSTEMS:  CONSTITUTIONAL: No fever, weight loss, or fatigue  EYES: No eye pain, visual disturbances, or discharge  ENMT:  No difficulty hearing, tinnitus, vertigo; No sinus or throat pain  NECK: No pain or stiffness  BREASTS: No pain, masses, or nipple discharge  RESPIRATORY: No cough, wheezing, chills or hemoptysis; No shortness of breath  CARDIOVASCULAR: No chest pain, palpitations, dizziness, or leg swelling  GASTROINTESTINAL: No abdominal or epigastric pain. No nausea, vomiting, or hematemesis; No diarrhea or constipation. No melena or hematochezia.  GENITOURINARY: No dysuria, frequency, hematuria, or incontinence  NEUROLOGICAL: No headaches, memory loss, loss of strength, numbness, or tremors  SKIN: No itching, burning, rashes, or lesions   LYMPH NODES: No enlarged glands  ENDOCRINE: No heat or cold intolerance; No hair loss  MUSCULOSKELETAL: No joint pain or swelling; No muscle, back, or extremity pain  PSYCHIATRIC: No depression, anxiety, mood swings, or difficulty sleeping  HEME/LYMPH: No easy bruising, or bleeding gums  ALLERGY AND IMMUNOLOGIC: No hives or eczema    Vital Signs Last 24 Hrs  T(C): 36.1 (08 Dec 2018 11:40), Max: 36.6 (07 Dec 2018 23:10)  T(F): 97 (08 Dec 2018 11:40), Max: 97.8 (07 Dec 2018 23:10)  HR: 77 (08 Dec 2018 11:40) (69 - 80)  BP: 104/64 (08 Dec 2018 11:40) (101/60 - 106/67)  BP(mean): --  RR: 17 (08 Dec 2018 11:40) (16 - 17)  SpO2: 100% (08 Dec 2018 11:40) (94% - 100%)    PHYSICAL EXAM:  GENERAL: NAD, well-groomed, well-developed  HEAD:  Atraumatic, Normocephalic  EYES: EOMI, PERRLA, conjunctiva and sclera clear  ENMT: No tonsillar erythema, exudates, or enlargement; Moist mucous membranes, Good dentition, No lesions  NECK: Supple, No JVD, Normal thyroid  NERVOUS SYSTEM:  Alert & Oriented X3, Good concentration; Motor Strength 5/5 B/L upper and lower extremities; DTRs 2+ intact and symmetric  CHEST/LUNG: Clear to percussion bilaterally; No rales, rhonchi, wheezing, or rubs  HEART: Regular rate and rhythm; No murmurs, rubs, or gallops  ABDOMEN: Soft, Nontender, Nondistended; Bowel sounds present  EXTREMITIES:  2+ Peripheral Pulses, No clubbing, cyanosis, or edema  LYMPH: No lymphadenopathy noted  SKIN: No rashes or lesions    LABS:                        11.9   7.14  )-----------( 93       ( 08 Dec 2018 08:14 )             34.3     12-08    139  |  109<H>  |  9   ----------------------------<  73  4.0   |  24  |  0.90    Ca    8.6      08 Dec 2018 08:14          CAPILLARY BLOOD GLUCOSE        CULTURES:    HEMOGLOBIN A1C:    CHOLESTEROL:        RADIOLOGY & ADDITIONAL TESTS:

## 2018-12-08 NOTE — PROGRESS NOTE ADULT - ASSESSMENT
Subjective Complaints:  Historian:         REVIEW OF SYSTEMS:  Eyes:  Good vision, no reported pain  ENT:  No sore throat, pain, runny nose, dysphagia  CV:  No pain, palpitatioins, hypo/hypertension  Resp:  No dyspnea, cough, tachypnea, wheezing  GI:  No pain, nausea, vomiting, diarrhea, constipatiion  Muscle:  No pain, weakness  Neuro:  No weakness, tingling, memory problems  Psych:  No fatigue, insomnia, mood problems, depression  Endocrine:  No polyuria, polydypsia, cold/heat intolerance    Vital Signs Last 24 Hrs  T(C): 36.7 (08 Dec 2018 17:21), Max: 36.7 (08 Dec 2018 17:21)  T(F): 98 (08 Dec 2018 17:21), Max: 98 (08 Dec 2018 17:21)  HR: 74 (08 Dec 2018 17:21) (69 - 80)  BP: 97/63 (08 Dec 2018 17:21) (97/63 - 106/67)  BP(mean): --  RR: 17 (08 Dec 2018 17:21) (16 - 17)  SpO2: 100% (08 Dec 2018 17:21) (94% - 100%)    GENERAL PHYSICAL EXAM:  General:  Appears stated age, well-groomed, well-nourished, no distress  HEENT:  NC/AT, patent nares w/ pink mucosa, OP clear w/o lesions, PERRL, EOMI, conjunctivae clear, no thyromegaly, nodules, adenopathy, no JVD  Chest:  Full & symmetric excursion, no increased effort, breath sounds clear  Cardiovascular:  Regular rhythm, S1, S2, no murmur/rub/S3/S4, no carotid/femoral/abdominal bruit, radial/pedal pulses 2+, no edema  Abdomen:  Soft, non-tender, non-distended, normoactive bowel sounds, no HSM  Extremities:  Gait & station:   Digits:   Nails:   Joints, Bones, Muscles:   ROM:   Stability:  Skin:  No rash/erythema/ecchymoses/petechiae/wounds/abscess/warm/dry  Musculoskeletal:  Full ROM in all joints w/o swelling/tenderness/effusion    NEUROLOGICAL EXAM:  HENT:  Normocephalic head; atraumatic head.  Neck supple.  ENT: normal looking.  Mental State:    Alert.  Fully oriented to person, place and date.  Coherent.  Speech clear and intact.  Cooperative.  Responds appropriately.    Cranial Nerves:  II-XII:   Pupils round and reactive to light and accommodation.  Extraocular movements full.  Visual fields full (no homonymous hemianopsia).  Visual acuity wnl.  Facial symmetry intact.  Tongue midline.  Motor Functions:  Moves all extremities.  No pronator drift of UE.  Claps hand well.  Hand  intact bilaterally.  Ambulatory.    Sensory Functions:   Intact to touch and pinprick to face and extremities.    Reflexes:  Deep tendon reflexes normoactive to biceps, knees and ankles.  Babinski absent (present).  Cerebellar Testing:    Finger to nose intact.  Nystagmus absent.  Neurovascular: Carotid auscultation full without bruits.      LABS:                        11.9   7.14  )-----------( 93       ( 08 Dec 2018 08:14 )             34.3     12-08    139  |  109<H>  |  9   ----------------------------<  73  4.0   |  24  |  0.90    Ca    8.6      08 Dec 2018 08:14       ass=  jeramy phillips speech flue t arm leg 4/5  mri rosalva no acute path hx of hiv   discuss with family no dizziness   hiv encephalaopthy  for rehab will follow   RADIOLOGY & ADDITIONAL STUDIES:        Recommendations:  Brain MRI.  Carotid doppler.  Echocardiogram.  EEG.   DVT prophylaxis as ordered.  Medications:

## 2018-12-09 LAB
ANION GAP SERPL CALC-SCNC: 8 MMOL/L — SIGNIFICANT CHANGE UP (ref 5–17)
BUN SERPL-MCNC: 8 MG/DL — SIGNIFICANT CHANGE UP (ref 7–23)
CALCIUM SERPL-MCNC: 8.8 MG/DL — SIGNIFICANT CHANGE UP (ref 8.5–10.1)
CHLORIDE SERPL-SCNC: 106 MMOL/L — SIGNIFICANT CHANGE UP (ref 96–108)
CO2 SERPL-SCNC: 25 MMOL/L — SIGNIFICANT CHANGE UP (ref 22–31)
CREAT SERPL-MCNC: 0.78 MG/DL — SIGNIFICANT CHANGE UP (ref 0.5–1.3)
GLUCOSE SERPL-MCNC: 94 MG/DL — SIGNIFICANT CHANGE UP (ref 70–99)
HCT VFR BLD CALC: 33.1 % — LOW (ref 39–50)
HGB BLD-MCNC: 11.9 G/DL — LOW (ref 13–17)
MCHC RBC-ENTMCNC: 29.5 PG — SIGNIFICANT CHANGE UP (ref 27–34)
MCHC RBC-ENTMCNC: 36 GM/DL — SIGNIFICANT CHANGE UP (ref 32–36)
MCV RBC AUTO: 82.1 FL — SIGNIFICANT CHANGE UP (ref 80–100)
NRBC # BLD: 0 /100 WBCS — SIGNIFICANT CHANGE UP (ref 0–0)
PLATELET # BLD AUTO: 111 K/UL — LOW (ref 150–400)
POTASSIUM SERPL-MCNC: 3.8 MMOL/L — SIGNIFICANT CHANGE UP (ref 3.5–5.3)
POTASSIUM SERPL-SCNC: 3.8 MMOL/L — SIGNIFICANT CHANGE UP (ref 3.5–5.3)
RBC # BLD: 4.03 M/UL — LOW (ref 4.2–5.8)
RBC # FLD: 14.1 % — SIGNIFICANT CHANGE UP (ref 10.3–14.5)
SODIUM SERPL-SCNC: 139 MMOL/L — SIGNIFICANT CHANGE UP (ref 135–145)
WBC # BLD: 7.42 K/UL — SIGNIFICANT CHANGE UP (ref 3.8–10.5)
WBC # FLD AUTO: 7.42 K/UL — SIGNIFICANT CHANGE UP (ref 3.8–10.5)

## 2018-12-09 RX ADMIN — Medication 81 MILLIGRAM(S): at 11:59

## 2018-12-09 RX ADMIN — Medication 200 MILLIGRAM(S): at 06:08

## 2018-12-09 RX ADMIN — Medication 200 MILLIGRAM(S): at 17:14

## 2018-12-09 RX ADMIN — Medication 1 TABLET(S): at 12:00

## 2018-12-09 NOTE — PROGRESS NOTE ADULT - SUBJECTIVE AND OBJECTIVE BOX
58 year old male presents today accompanied with his daughter who reports that he has not been himself for the last three days, he appears very weak, confused and has poor appetite, at baseline pt is alert and oriented x 3 and independent, (-) chest pain (-) sob (_) nausea (-) vomiting (-) diarrhea (-) abdomional pain, pt did fall one week ago, his daughter woke up and found him on the floor lying on his back, he did not recall how he got there (01 Dec 2018 20:40)  third admission since september     Allergies    No Known Allergies    Intolerances        MEDICATIONS  (STANDING):  aspirin enteric coated 81 milliGRAM(s) Oral daily  cefpodoxime 200 milliGRAM(s) Oral every 12 hours  trimethoprim  160 mG/sulfamethoxazole 800 mG 1 Tablet(s) Oral daily    MEDICATIONS  (PRN):  acetaminophen   Tablet .. 325 milliGRAM(s) Oral every 4 hours PRN Temp greater or equal to 38C (100.4F)  aluminum hydroxide/magnesium hydroxide/simethicone Suspension 30 milliLiter(s) Oral every 6 hours PRN Dyspepsia  polyethylene glycol 3350 17 Gram(s) Oral daily PRN Constipation      REVIEW OF SYSTEMS:    CONSTITUTIONAL: No fever, chills, weight loss, or fatigue  HEENT: No sore throat, runny nose, ear ache  RESPIRATORY: No cough, wheezing, No shortness of breath  CARDIOVASCULAR: No chest pain, palpitations, dizziness  GASTROINTESTINAL: No abdominal pain. No nausea, vomiting, diarrhea  GENITOURINARY: No dysuria, increase frequency, hematuria, or incontinence  NEUROLOGICAL: No headaches, memory loss, loss of strength, numbness, or tremors, no weakness  EXTREMITY: No pedal edema BLE  SKIN: No itching, burning, rashes, or lesions     VITAL SIGNS:  T(C): 36.2 (12-09-18 @ 11:36), Max: 37.1 (12-08-18 @ 23:50)  T(F): 97.2 (12-09-18 @ 11:36), Max: 98.7 (12-08-18 @ 23:50)  HR: 75 (12-09-18 @ 11:36) (70 - 76)  BP: 106/73 (12-09-18 @ 11:36) (97/63 - 106/73)  RR: 17 (12-09-18 @ 11:36) (17 - 17)  SpO2: 98% (12-09-18 @ 11:36) (97% - 100%)  Wt(kg): --    PHYSICAL EXAM:    GENERAL: not in any distress  HEENT: Neck is supple, normocephalic, atraumatic   CHEST/LUNG: Clear to auscultation bilaterally; No rales, rhonchi, wheezing  HEART: Regular rate and rhythm; No murmurs, rubs, or gallops  ABDOMEN: Soft, Nontender, Nondistended; Bowel sounds present, no rebound   EXTREMITIES:  2+ Peripheral Pulses, No clubbing, cyanosis, or edema  SKIN: No rashes or lesions  BACK: no pressor sore   NERVOUS SYSTEM:  Alert & Oriented X3, Good concentration  PSYCH: normal affect     LABS:                         11.9   7.42  )-----------( 111      ( 09 Dec 2018 07:17 )             33.1     12-09    139  |  106  |  8   ----------------------------<  94  3.8   |  25  |  0.78    Ca    8.8      09 Dec 2018 07:17                      Rapid HIV-1/2 Antibody: Reactive (12-04 @ 18:58)                          Radiology: 58 year old male presents today accompanied with his daughter who reports that he has not been himself for the last three days, he appears very weak, confused and has poor appetite, at baseline pt is alert and oriented x 3 and independent, (-) chest pain (-) sob (_) nausea (-) vomiting (-) diarrhea (-) abdomional pain, pt did fall one week ago, his daughter woke up and found him on the floor lying on his back, he did not recall how he got there (01 Dec 2018 20:40)  third admission since september   work up consistent with aids    Allergies    No Known Allergies    Intolerances        MEDICATIONS  (STANDING):  aspirin enteric coated 81 milliGRAM(s) Oral daily  cefpodoxime 200 milliGRAM(s) Oral every 12 hours  trimethoprim  160 mG/sulfamethoxazole 800 mG 1 Tablet(s) Oral daily    MEDICATIONS  (PRN):  acetaminophen   Tablet .. 325 milliGRAM(s) Oral every 4 hours PRN Temp greater or equal to 38C (100.4F)  aluminum hydroxide/magnesium hydroxide/simethicone Suspension 30 milliLiter(s) Oral every 6 hours PRN Dyspepsia  polyethylene glycol 3350 17 Gram(s) Oral daily PRN Constipation      REVIEW OF SYSTEMS:    CONSTITUTIONAL: No fever, chills, weight loss, or fatigue  HEENT: No sore throat, runny nose, ear ache  RESPIRATORY: No cough, wheezing, No shortness of breath  CARDIOVASCULAR: No chest pain, palpitations, dizziness  GASTROINTESTINAL: No abdominal pain. No nausea, vomiting, diarrhea  GENITOURINARY: No dysuria, increase frequency, hematuria, or incontinence  NEUROLOGICAL: No headaches, memory loss, loss of strength, numbness, or tremors, no weakness  EXTREMITY: No pedal edema BLE  SKIN: No itching, burning, rashes, or lesions     VITAL SIGNS:  T(C): 36.2 (12-09-18 @ 11:36), Max: 37.1 (12-08-18 @ 23:50)  T(F): 97.2 (12-09-18 @ 11:36), Max: 98.7 (12-08-18 @ 23:50)  HR: 75 (12-09-18 @ 11:36) (70 - 76)  BP: 106/73 (12-09-18 @ 11:36) (97/63 - 106/73)  RR: 17 (12-09-18 @ 11:36) (17 - 17)  SpO2: 98% (12-09-18 @ 11:36) (97% - 100%)  Wt(kg): --    PHYSICAL EXAM:    GENERAL: not in any distress  HEENT: Neck is supple, normocephalic, atraumatic   CHEST/LUNG: Clear to auscultation bilaterally; No rales, rhonchi, wheezing  HEART: Regular rate and rhythm; No murmurs, rubs, or gallops  ABDOMEN: Soft, Nontender, Nondistended; Bowel sounds present, no rebound   EXTREMITIES:  2+ Peripheral Pulses, No clubbing, cyanosis, or edema  SKIN: No rashes or lesions  BACK: no pressor sore   NERVOUS SYSTEM:  Alert & Oriented X3    LABS:                         11.9   7.42  )-----------( 111      ( 09 Dec 2018 07:17 )             33.1     12-09    139  |  106  |  8   ----------------------------<  94  3.8   |  25  |  0.78    Ca    8.8      09 Dec 2018 07:17                      Rapid HIV-1/2 Antibody: Reactive (12-04 @ 18:58)                          Radiology:

## 2018-12-09 NOTE — PROGRESS NOTE ADULT - ASSESSMENT
Subjective Complaints:  Historian:         REVIEW OF SYSTEMS:  Eyes:  Good vision, no reported pain  ENT:  No sore throat, pain, runny nose, dysphagia  CV:  No pain, palpitatioins, hypo/hypertension  Resp:  No dyspnea, cough, tachypnea, wheezing  GI:  No pain, nausea, vomiting, diarrhea, constipatiion  Muscle:  No pain, weakness  Neuro:  No weakness, tingling, memory problems  Psych:  No fatigue, insomnia, mood problems, depression  Endocrine:  No polyuria, polydypsia, cold/heat intolerance    Vital Signs Last 24 Hrs  T(C): 36.2 (09 Dec 2018 11:36), Max: 37.1 (08 Dec 2018 23:50)  T(F): 97.2 (09 Dec 2018 11:36), Max: 98.7 (08 Dec 2018 23:50)  HR: 75 (09 Dec 2018 11:36) (70 - 76)  BP: 106/73 (09 Dec 2018 11:36) (97/63 - 106/73)  BP(mean): --  RR: 17 (09 Dec 2018 11:36) (17 - 17)  SpO2: 98% (09 Dec 2018 11:36) (97% - 100%)    GENERAL PHYSICAL EXAM:  General:  Appears stated age, well-groomed, well-nourished, no distress  HEENT:  NC/AT, patent nares w/ pink mucosa, OP clear w/o lesions, PERRL, EOMI, conjunctivae clear, no thyromegaly, nodules, adenopathy, no JVD  Chest:  Full & symmetric excursion, no increased effort, breath sounds clear  Cardiovascular:  Regular rhythm, S1, S2, no murmur/rub/S3/S4, no carotid/femoral/abdominal bruit, radial/pedal pulses 2+, no edema  Abdomen:  Soft, non-tender, non-distended, normoactive bowel sounds, no HSM  Extremities:  Gait & station:   Digits:   Nails:   Joints, Bones, Muscles:   ROM:   Stability:  Skin:  No rash/erythema/ecchymoses/petechiae/wounds/abscess/warm/dry  Musculoskeletal:  Full ROM in all joints w/o swelling/tenderness/effusion    NEUROLOGICAL EXAM:  HENT:  Normocephalic head; atraumatic head.  Neck supple.  ENT: normal looking.  Mental State:    Alert.  Fully oriented to person, place and date.  Coherent.  Speech clear and intact.  Cooperative.  Responds appropriately.    Cranial Nerves:  II-XII:   Pupils round and reactive to light and accommodation.  Extraocular movements full.  Visual fields full (no homonymous hemianopsia).  Visual acuity wnl.  Facial symmetry intact.  Tongue midline.  Motor Functions:  Moves all extremities.  No pronator drift of UE.  Claps hand well.  Hand  intact bilaterally.  Ambulatory.    Sensory Functions:   Intact to touch and pinprick to face and extremities.    Reflexes:  Deep tendon reflexes normoactive to biceps, knees and ankles.  Babinski absent (present).  Cerebellar Testing:    Finger to nose intact.  Nystagmus absent.  Neurovascular: Carotid auscultation full without bruits.      LABS:                        11.9   7.42  )-----------( 111      ( 09 Dec 2018 07:17 )             33.1     12-09    139  |  106  |  8   ----------------------------<  94  3.8   |  25  |  0.78    Ca    8.8      09 Dec 2018 07:17     ass= awake alert speech fluent  arm leg 4/5 hiv encephalaopthy  unsteady gait for  pt rehab no  seizure mri rosalva no acute path   RADIOLOGY & ADDITIONAL STUDIES:        Recommendations:   for pt rehab

## 2018-12-09 NOTE — PROGRESS NOTE ADULT - SUBJECTIVE AND OBJECTIVE BOX
INTERVAL HPI:   58 year old male presented  accompanied with his daughter who reports that he has not been himself for  three days, he appeared  very weak, confused and reported to have  poor appetite. At baseline  is alert and oriented x 3 and independent, (-) chest pain (-) sob (_) nausea (-) vomiting (-) diarrhea (-) abdomional pain, pt did fall one week ago, his daughter woke up and found him on the floor lying on his back, he did not recall how he got there . So brought with altered mental status, found to have +ve HIV, E Coli UTI, Bacteremia and possible RLL pneumonia.  not able to provide details of history.     OVERNIGHT EVENTS:  Comfortable.    Vital Signs Last 24 Hrs  T(C): 36.2 (09 Dec 2018 11:36), Max: 37.1 (08 Dec 2018 23:50)  T(F): 97.2 (09 Dec 2018 11:36), Max: 98.7 (08 Dec 2018 23:50)  HR: 75 (09 Dec 2018 11:36) (70 - 76)  BP: 106/73 (09 Dec 2018 11:36) (97/63 - 106/73)  BP(mean): --  RR: 17 (09 Dec 2018 11:36) (17 - 17)  SpO2: 98% (09 Dec 2018 11:36) (97% - 100%)    PHYSICAL EXAM:  GEN:         Awake, responsive and comfortable.  HEENT:    Normal.    RESP:       no wheezing.  CVS:          Regular rate and rhythm.   ABD:         Soft, non-tender, non-distended;     MEDICATIONS  (STANDING):  aspirin enteric coated 81 milliGRAM(s) Oral daily  cefpodoxime 200 milliGRAM(s) Oral every 12 hours  trimethoprim  160 mG/sulfamethoxazole 800 mG 1 Tablet(s) Oral daily    MEDICATIONS  (PRN):  acetaminophen   Tablet .. 325 milliGRAM(s) Oral every 4 hours PRN Temp greater or equal to 38C (100.4F)  aluminum hydroxide/magnesium hydroxide/simethicone Suspension 30 milliLiter(s) Oral every 6 hours PRN Dyspepsia  polyethylene glycol 3350 17 Gram(s) Oral daily PRN Constipation    LABS:                        11.9   7.42  )-----------( 111      ( 09 Dec 2018 07:17 )             33.1     12-09    139  |  106  |  8   ----------------------------<  94  3.8   |  25  |  0.78    Ca    8.8      09 Dec 2018 07:17    ASSESSMENT AND PLAN:  ·	RLL Pneumonia.  ·	UTI with E COLI.  ·	Altered Mental Status.  ·	HIV/AIDS.  ·	Coagulase negative staph bacteremia    Continue treatment.

## 2018-12-09 NOTE — PROGRESS NOTE ADULT - SUBJECTIVE AND OBJECTIVE BOX
Patient is a 58y old  Male who presents with a chief complaint of falls (09 Dec 2018 17:05)      INTERVAL HPI/OVERNIGHT EVENTS:  No new complaints.  Discussed with ID.  wILL BE STARTED ON [facility].  MEDICATIONS  (STANDING):  aspirin enteric coated 81 milliGRAM(s) Oral daily  cefpodoxime 200 milliGRAM(s) Oral every 12 hours  trimethoprim  160 mG/sulfamethoxazole 800 mG 1 Tablet(s) Oral daily    MEDICATIONS  (PRN):  acetaminophen   Tablet .. 325 milliGRAM(s) Oral every 4 hours PRN Temp greater or equal to 38C (100.4F)  aluminum hydroxide/magnesium hydroxide/simethicone Suspension 30 milliLiter(s) Oral every 6 hours PRN Dyspepsia  polyethylene glycol 3350 17 Gram(s) Oral daily PRN Constipation      Allergies    No Known Allergies    Intolerances        REVIEW OF SYSTEMS:  CONSTITUTIONAL: No fever, weight loss, or fatigue  EYES: No eye pain, visual disturbances, or discharge  ENMT:  No difficulty hearing, tinnitus, vertigo; No sinus or throat pain  NECK: No pain or stiffness  BREASTS: No pain, masses, or nipple discharge  RESPIRATORY: No cough, wheezing, chills or hemoptysis; No shortness of breath  CARDIOVASCULAR: No chest pain, palpitations, dizziness, or leg swelling  GASTROINTESTINAL: No abdominal or epigastric pain. No nausea, vomiting, or hematemesis; No diarrhea or constipation. No melena or hematochezia.  GENITOURINARY: No dysuria, frequency, hematuria, or incontinence  NEUROLOGICAL: No headaches, memory loss, loss of strength, numbness, or tremors  SKIN: No itching, burning, rashes, or lesions   LYMPH NODES: No enlarged glands  ENDOCRINE: No heat or cold intolerance; No hair loss  MUSCULOSKELETAL: No joint pain or swelling; No muscle, back, or extremity pain  PSYCHIATRIC: No depression, anxiety, mood swings, or difficulty sleeping  HEME/LYMPH: No easy bruising, or bleeding gums  ALLERGY AND IMMUNOLOGIC: No hives or eczema    Vital Signs Last 24 Hrs  T(C): 35.9 (09 Dec 2018 17:32), Max: 37.1 (08 Dec 2018 23:50)  T(F): 96.7 (09 Dec 2018 17:32), Max: 98.7 (08 Dec 2018 23:50)  HR: 89 (09 Dec 2018 17:32) (70 - 89)  BP: 98/63 (09 Dec 2018 17:32) (98/63 - 106/73)  BP(mean): --  RR: 16 (09 Dec 2018 17:32) (16 - 17)  SpO2: 98% (09 Dec 2018 17:32) (97% - 100%)    PHYSICAL EXAM:  GENERAL: NAD, well-groomed, well-developed  HEAD:  Atraumatic, Normocephalic  EYES: EOMI, PERRLA, conjunctiva and sclera clear  ENMT: No tonsillar erythema, exudates, or enlargement; Moist mucous membranes, Good dentition, No lesions  NECK: Supple, No JVD, Normal thyroid  NERVOUS SYSTEM:  Alert & Oriented X3, Good concentration; Motor Strength 5/5 B/L upper and lower extremities; DTRs 2+ intact and symmetric  CHEST/LUNG: Clear to percussion bilaterally; No rales, rhonchi, wheezing, or rubs  HEART: Regular rate and rhythm; No murmurs, rubs, or gallops  ABDOMEN: Soft, Nontender, Nondistended; Bowel sounds present  EXTREMITIES:  2+ Peripheral Pulses, No clubbing, cyanosis, or edema  LYMPH: No lymphadenopathy noted  SKIN: No rashes or lesions    LABS:                        11.9   7.42  )-----------( 111      ( 09 Dec 2018 07:17 )             33.1     12-09    139  |  106  |  8   ----------------------------<  94  3.8   |  25  |  0.78    Ca    8.8      09 Dec 2018 07:17          CAPILLARY BLOOD GLUCOSE        CULTURES:    HEMOGLOBIN A1C:    CHOLESTEROL:        RADIOLOGY & ADDITIONAL TESTS:

## 2018-12-09 NOTE — PROGRESS NOTE ADULT - ASSESSMENT
aids   recurrent falls   hiv dementia ?/   neuro to address cognitive issues   prophylactic bactrim started yesterday   need to  speak to wife ;; called her no [phone] multiple times   recording says she is refusing all calls   i am unsure he understands   patient was not surprised by diagnosis  says  10 years ; knew 10 women before marriage   says my wife is HIV pos and she told him that last year  plan needs o be  discussed with wife   discharge plan on prophyllctic bactrim   needs to start HAART aids   recurrent falls   hiv dementia ?/   neuro to address cognitive issues   prophylactic bactrim started   still unable to speak to wife  spoke to dr corley today ;; he spoke to wife and she wants  to start hiv meds  need to  speak to wife ;; called her no [phone] multiple times   patient was not surprised by diagnosis  says  10 years ; knew 10 women before marriage   says my wife is HIV pos and she told him that last year  plan needs o be  discussed with wife neverthess  discharge plan on prophyllctic bactrim   genvoya strted today

## 2018-12-10 LAB
ANION GAP SERPL CALC-SCNC: 8 MMOL/L — SIGNIFICANT CHANGE UP (ref 5–17)
BUN SERPL-MCNC: 10 MG/DL — SIGNIFICANT CHANGE UP (ref 7–23)
CALCIUM SERPL-MCNC: 8.9 MG/DL — SIGNIFICANT CHANGE UP (ref 8.5–10.1)
CHLORIDE SERPL-SCNC: 107 MMOL/L — SIGNIFICANT CHANGE UP (ref 96–108)
CO2 SERPL-SCNC: 23 MMOL/L — SIGNIFICANT CHANGE UP (ref 22–31)
CREAT SERPL-MCNC: 0.88 MG/DL — SIGNIFICANT CHANGE UP (ref 0.5–1.3)
GLUCOSE SERPL-MCNC: 79 MG/DL — SIGNIFICANT CHANGE UP (ref 70–99)
HCT VFR BLD CALC: 36.5 % — LOW (ref 39–50)
HGB BLD-MCNC: 12.8 G/DL — LOW (ref 13–17)
MCHC RBC-ENTMCNC: 28.5 PG — SIGNIFICANT CHANGE UP (ref 27–34)
MCHC RBC-ENTMCNC: 35.1 GM/DL — SIGNIFICANT CHANGE UP (ref 32–36)
MCV RBC AUTO: 81.3 FL — SIGNIFICANT CHANGE UP (ref 80–100)
NRBC # BLD: 0 /100 WBCS — SIGNIFICANT CHANGE UP (ref 0–0)
PLATELET # BLD AUTO: 118 K/UL — LOW (ref 150–400)
POTASSIUM SERPL-MCNC: 3.9 MMOL/L — SIGNIFICANT CHANGE UP (ref 3.5–5.3)
POTASSIUM SERPL-SCNC: 3.9 MMOL/L — SIGNIFICANT CHANGE UP (ref 3.5–5.3)
RBC # BLD: 4.49 M/UL — SIGNIFICANT CHANGE UP (ref 4.2–5.8)
RBC # FLD: 13.9 % — SIGNIFICANT CHANGE UP (ref 10.3–14.5)
SODIUM SERPL-SCNC: 138 MMOL/L — SIGNIFICANT CHANGE UP (ref 135–145)
WBC # BLD: 8.15 K/UL — SIGNIFICANT CHANGE UP (ref 3.8–10.5)
WBC # FLD AUTO: 8.15 K/UL — SIGNIFICANT CHANGE UP (ref 3.8–10.5)

## 2018-12-10 RX ORDER — LACTOBACILLUS ACIDOPHILUS 100MM CELL
0 CAPSULE ORAL
Qty: 0 | Refills: 0 | COMMUNITY
Start: 2018-12-10

## 2018-12-10 RX ORDER — POLYETHYLENE GLYCOL 3350 17 G/17G
17 POWDER, FOR SOLUTION ORAL
Qty: 0 | Refills: 0 | COMMUNITY
Start: 2018-12-10

## 2018-12-10 RX ORDER — LACTOBACILLUS ACIDOPHILUS 100MM CELL
1 CAPSULE ORAL
Qty: 0 | Refills: 0 | Status: DISCONTINUED | OUTPATIENT
Start: 2018-12-10 | End: 2018-12-13

## 2018-12-10 RX ORDER — CEFPODOXIME PROXETIL 100 MG
1 TABLET ORAL
Qty: 0 | Refills: 0 | DISCHARGE
Start: 2018-12-10

## 2018-12-10 RX ORDER — ZINC OXIDE 200 MG/G
1 OINTMENT TOPICAL EVERY 8 HOURS
Qty: 0 | Refills: 0 | Status: DISCONTINUED | OUTPATIENT
Start: 2018-12-10 | End: 2018-12-13

## 2018-12-10 RX ORDER — ZINC OXIDE 200 MG/G
1 OINTMENT TOPICAL
Qty: 0 | Refills: 0 | COMMUNITY
Start: 2018-12-10

## 2018-12-10 RX ORDER — ACETAMINOPHEN 500 MG
1 TABLET ORAL
Qty: 0 | Refills: 0 | DISCHARGE
Start: 2018-12-10

## 2018-12-10 RX ORDER — ELVITEGRAVIR, COBICISTAT, EMTRICITABINE, AND TENOFOVIR ALAFENAMIDE 150; 150; 200; 10 MG/1; MG/1; MG/1; MG/1
1 TABLET ORAL DAILY
Qty: 0 | Refills: 0 | Status: DISCONTINUED | OUTPATIENT
Start: 2018-12-10 | End: 2018-12-13

## 2018-12-10 RX ORDER — ELVITEGRAVIR, COBICISTAT, EMTRICITABINE, AND TENOFOVIR ALAFENAMIDE 150; 150; 200; 10 MG/1; MG/1; MG/1; MG/1
1 TABLET ORAL
Qty: 0 | Refills: 0 | DISCHARGE
Start: 2018-12-10

## 2018-12-10 RX ORDER — ASPIRIN/CALCIUM CARB/MAGNESIUM 324 MG
1 TABLET ORAL
Qty: 0 | Refills: 0 | DISCHARGE
Start: 2018-12-10

## 2018-12-10 RX ADMIN — ELVITEGRAVIR, COBICISTAT, EMTRICITABINE, AND TENOFOVIR ALAFENAMIDE 1 TABLET(S): 150; 150; 200; 10 TABLET ORAL at 11:06

## 2018-12-10 RX ADMIN — Medication 200 MILLIGRAM(S): at 05:46

## 2018-12-10 RX ADMIN — Medication 200 MILLIGRAM(S): at 17:03

## 2018-12-10 RX ADMIN — Medication 1 TABLET(S): at 11:06

## 2018-12-10 RX ADMIN — Medication 1 TABLET(S): at 17:03

## 2018-12-10 RX ADMIN — Medication 81 MILLIGRAM(S): at 11:06

## 2018-12-10 NOTE — PROGRESS NOTE ADULT - ASSESSMENT
aids   recurrent falls   hiv dementia ?/   neuro to address cognitive issues   prophylactic bactrim started   still unable to speak to wife  spoke to dr corley today ;; he spoke to wife and she wants  to start hiv meds  need to  speak to wife ;; called her no [phone] multiple times   patient was not surprised by diagnosis  says  10 years ; knew 10 women before marriage   says my wife is HIV pos and she told him that last year  plan needs o be  discussed with wife neverthess  discharge plan on prophyllctic bactrim   genvoya strted today aids   recurrent falls   hiv dementia ?/   neuro to address cognitive issues   prophylactic bactrim started   need to  speak to wife ;; called her no [phone] multiple times   patient was not surprised by diagnosis  discharge plan on prophyllctic bactrim and   genvoya

## 2018-12-10 NOTE — PROGRESS NOTE ADULT - SUBJECTIVE AND OBJECTIVE BOX
INTERVAL HPI:  58 year old male presented  accompanied with his daughter who reports that he has not been himself for  three days, he appeared  very weak, confused and reported to have  poor appetite. At baseline  is alert and oriented x 3 and independent, (-) chest pain (-) sob (_) nausea (-) vomiting (-) diarrhea (-) abdomional pain, pt did fall one week ago, his daughter woke up and found him on the floor lying on his back, he did not recall how he got there . So brought with altered mental status, found to have +ve HIV, E Coli UTI, Bacteremia and possible RLL pneumonia.  not able to provide details of history.     OVERNIGHT EVENTS:  Out of bed to chair.    Vital Signs Last 24 Hrs  T(C): 36.7 (10 Dec 2018 17:22), Max: 37.2 (10 Dec 2018 05:11)  T(F): 98 (10 Dec 2018 17:22), Max: 99 (10 Dec 2018 05:11)  HR: 87 (10 Dec 2018 17:22) (79 - 87)  BP: 100/68 (10 Dec 2018 17:22) (99/57 - 110/67)  BP(mean): --  RR: 17 (10 Dec 2018 17:22) (16 - 18)  SpO2: 99% (10 Dec 2018 17:22) (98% - 100%)    PHYSICAL EXAM:  GEN:         Awake, responsive and comfortable.  HEENT:    Normal.    RESP:       no wheezing.  CVS:          Regular rate and rhythm.   ABD:         Soft, non-tender, non-distended;     MEDICATIONS  (STANDING):  aspirin enteric coated 81 milliGRAM(s) Oral daily  cefpodoxime 200 milliGRAM(s) Oral every 12 hours  lactobacillus acidophilus 1 Tablet(s) Oral three times a day with meals  tenofovir alafenamide 10 mG/[id number] mG/cobicistat 150 mG/emtricitabine 200 mG (GENVOYA) 1 Tablet(s) Oral daily  trimethoprim  160 mG/sulfamethoxazole 800 mG 1 Tablet(s) Oral daily    MEDICATIONS  (PRN):  acetaminophen   Tablet .. 325 milliGRAM(s) Oral every 4 hours PRN Temp greater or equal to 38C (100.4F)  aluminum hydroxide/magnesium hydroxide/simethicone Suspension 30 milliLiter(s) Oral every 6 hours PRN Dyspepsia  polyethylene glycol 3350 17 Gram(s) Oral daily PRN Constipation  zinc oxide 40% Ointment 1 Application(s) Topical every 8 hours PRN diaper dermatitis    LABS:                        12.8   8.15  )-----------( 118      ( 10 Dec 2018 06:41 )             36.5     12-10    138  |  107  |  10  ----------------------------<  79  3.9   |  23  |  0.88    Ca    8.9      10 Dec 2018 06:41    ASSESSMENT AND PLAN:  ·	RLL Pneumonia.  ·	UTI with E COLI.  ·	Altered Mental Status.  ·	HIV/AIDS.  ·	Coagulase negative staph bacteremia    Clinically better, complete antibiotics per ID.  Discussed with daughter at bedside.

## 2018-12-10 NOTE — PROGRESS NOTE ADULT - ASSESSMENT
Subjective Complaints:  Historian:         REVIEW OF SYSTEMS:  Eyes:  Good vision, no reported pain  ENT:  No sore throat, pain, runny nose, dysphagia  CV:  No pain, palpitatioins, hypo/hypertension  Resp:  No dyspnea, cough, tachypnea, wheezing  GI:  No pain, nausea, vomiting, diarrhea, constipatiion  Muscle:  No pain, weakness  Neuro:  No weakness, tingling, memory problems  Psych:  No fatigue, insomnia, mood problems, depression  Endocrine:  No polyuria, polydypsia, cold/heat intolerance    Vital Signs Last 24 Hrs  T(C): 36.7 (10 Dec 2018 17:22), Max: 37.2 (10 Dec 2018 05:11)  T(F): 98 (10 Dec 2018 17:22), Max: 99 (10 Dec 2018 05:11)  HR: 87 (10 Dec 2018 17:22) (79 - 87)  BP: 100/68 (10 Dec 2018 17:22) (99/57 - 110/67)  BP(mean): --  RR: 17 (10 Dec 2018 17:22) (16 - 18)  SpO2: 99% (10 Dec 2018 17:22) (98% - 100%)    GENERAL PHYSICAL EXAM:  General:  Appears stated age, well-groomed, well-nourished, no distress  HEENT:  NC/AT, patent nares w/ pink mucosa, OP clear w/o lesions, PERRL, EOMI, conjunctivae clear, no thyromegaly, nodules, adenopathy, no JVD  Chest:  Full & symmetric excursion, no increased effort, breath sounds clear  Cardiovascular:  Regular rhythm, S1, S2, no murmur/rub/S3/S4, no carotid/femoral/abdominal bruit, radial/pedal pulses 2+, no edema  Abdomen:  Soft, non-tender, non-distended, normoactive bowel sounds, no HSM  Extremities:  Gait & station:   Digits:   Nails:   Joints, Bones, Muscles:   ROM:   Stability:  Skin:  No rash/erythema/ecchymoses/petechiae/wounds/abscess/warm/dry  Musculoskeletal:  Full ROM in all joints w/o swelling/tenderness/effusion    NEUROLOGICAL EXAM:  HENT:  Normocephalic head; atraumatic head.  Neck supple.  ENT: normal looking.  Mental State:    Alert.  Fully oriented to person, place and date.  Coherent.  Speech clear and intact.  Cooperative.  Responds appropriately.    Cranial Nerves:  II-XII:   Pupils round and reactive to light and accommodation.  Extraocular movements full.  Visual fields full (no homonymous hemianopsia).  Visual acuity wnl.  Facial symmetry intact.  Tongue midline.  Motor Functions:  Moves all extremities.  No pronator drift of UE.  Claps hand well.  Hand  intact bilaterally.  Ambulatory.    Sensory Functions:   Intact to touch and pinprick to face and extremities.    Reflexes:  Deep tendon reflexes normoactive to biceps, knees and ankles.  Babinski absent (present).  Cerebellar Testing:    Finger to nose intact.  Nystagmus absent.  Neurovascular: Carotid auscultation full without bruits.      LABS:                        12.8   8.15  )-----------( 118      ( 10 Dec 2018 06:41 )             36.5     12-10    138  |  107  |  10  ----------------------------<  79  3.9   |  23  |  0.88    Ca    8.9      10 Dec 2018 06:41     ass= awaek alert  speech fouent  arm leg 4/5 hiv  mri   brain no acute path unsteady gait  for pt         RADIOLOGY & ADDITIONAL STUDIES:        Recommendations:  discuss with family for rehab  tan enrique

## 2018-12-10 NOTE — PROGRESS NOTE ADULT - SUBJECTIVE AND OBJECTIVE BOX
Patient is a 58y old  Male who presents with a chief complaint of falls (09 Dec 2018 17:05)      INTERVAL HPI/OVERNIGHT EVENTS:  No new complaints.  Started on HAART and ready for discahrge.  MEDICATIONS  (STANDING):  aspirin enteric coated 81 milliGRAM(s) Oral daily  cefpodoxime 200 milliGRAM(s) Oral every 12 hours  lactobacillus acidophilus 1 Tablet(s) Oral three times a day with meals  tenofovir alafenamide 10 mG/joedghdiwzeq379 mG/cobicistat 150 mG/emtricitabine 200 mG (GENVOYA) 1 Tablet(s) Oral daily  trimethoprim  160 mG/sulfamethoxazole 800 mG 1 Tablet(s) Oral daily    MEDICATIONS  (PRN):  acetaminophen   Tablet .. 325 milliGRAM(s) Oral every 4 hours PRN Temp greater or equal to 38C (100.4F)  aluminum hydroxide/magnesium hydroxide/simethicone Suspension 30 milliLiter(s) Oral every 6 hours PRN Dyspepsia  polyethylene glycol 3350 17 Gram(s) Oral daily PRN Constipation  zinc oxide 40% Ointment 1 Application(s) Topical every 8 hours PRN diaper dermatitis      Allergies    No Known Allergies    Intolerances        REVIEW OF SYSTEMS:  CONSTITUTIONAL: No fever, weight loss, or fatigue  EYES: No eye pain, visual disturbances, or discharge  ENMT:  No difficulty hearing, tinnitus, vertigo; No sinus or throat pain  NECK: No pain or stiffness  BREASTS: No pain, masses, or nipple discharge  RESPIRATORY: No cough, wheezing, chills or hemoptysis; No shortness of breath  CARDIOVASCULAR: No chest pain, palpitations, dizziness, or leg swelling  GASTROINTESTINAL: No abdominal or epigastric pain. No nausea, vomiting, or hematemesis; No diarrhea or constipation. No melena or hematochezia.  GENITOURINARY: No dysuria, frequency, hematuria, or incontinence  NEUROLOGICAL: No headaches, memory loss, loss of strength, numbness, or tremors  SKIN: No itching, burning, rashes, or lesions   LYMPH NODES: No enlarged glands  ENDOCRINE: No heat or cold intolerance; No hair loss  MUSCULOSKELETAL: No joint pain or swelling; No muscle, back, or extremity pain  PSYCHIATRIC: No depression, anxiety, mood swings, or difficulty sleeping  HEME/LYMPH: No easy bruising, or bleeding gums  ALLERGY AND IMMUNOLOGIC: No hives or eczema    Vital Signs Last 24 Hrs  T(C): 36.6 (10 Dec 2018 10:52), Max: 37.2 (10 Dec 2018 05:11)  T(F): 97.9 (10 Dec 2018 10:52), Max: 99 (10 Dec 2018 05:11)  HR: 85 (10 Dec 2018 10:52) (79 - 89)  BP: 99/57 (10 Dec 2018 10:52) (98/63 - 110/67)  BP(mean): --  RR: 18 (10 Dec 2018 10:52) (16 - 18)  SpO2: 100% (10 Dec 2018 10:52) (98% - 100%)    PHYSICAL EXAM:  GENERAL: NAD, well-groomed, well-developed  HEAD:  Atraumatic, Normocephalic  EYES: EOMI, PERRLA, conjunctiva and sclera clear  ENMT: No tonsillar erythema, exudates, or enlargement; Moist mucous membranes, Good dentition, No lesions  NECK: Supple, No JVD, Normal thyroid  NERVOUS SYSTEM:  Alert & Oriented X3, Good concentration; Motor Strength 5/5 B/L upper and lower extremities; DTRs 2+ intact and symmetric  CHEST/LUNG: Clear to percussion bilaterally; No rales, rhonchi, wheezing, or rubs  HEART: Regular rate and rhythm; No murmurs, rubs, or gallops  ABDOMEN: Soft, Nontender, Nondistended; Bowel sounds present  EXTREMITIES:  2+ Peripheral Pulses, No clubbing, cyanosis, or edema  LYMPH: No lymphadenopathy noted  SKIN: No rashes or lesions    LABS:                        12.8   8.15  )-----------( 118      ( 10 Dec 2018 06:41 )             36.5     12-10    138  |  107  |  10  ----------------------------<  79  3.9   |  23  |  0.88    Ca    8.9      10 Dec 2018 06:41          CAPILLARY BLOOD GLUCOSE        CULTURES:    HEMOGLOBIN A1C:    CHOLESTEROL:        RADIOLOGY & ADDITIONAL TESTS:

## 2018-12-10 NOTE — PROGRESS NOTE ADULT - SUBJECTIVE AND OBJECTIVE BOX
HPI:  58 year old male presents today accompanied with his daughter who reports that he has not been himself for the last three days, he appears very weak, confused and has poor appetite, at baseline pt is alert and oriented x 3 and independent, (-) chest pain (-) sob (_) nausea (-) vomiting (-) diarrhea (-) abdomional pain, pt did fall one week ago, his daughter woke up and found him on the floor lying on his back, he did not recall how he got there (01 Dec 2018 20:40)      Allergies    No Known Allergies    Intolerances        MEDICATIONS  (STANDING):  aspirin enteric coated 81 milliGRAM(s) Oral daily  cefpodoxime 200 milliGRAM(s) Oral every 12 hours  lactobacillus acidophilus 1 Tablet(s) Oral three times a day with meals  tenofovir alafenamide 10 mG/bwsnxqbqrtyo853 mG/cobicistat 150 mG/emtricitabine 200 mG (GENVOYA) 1 Tablet(s) Oral daily  trimethoprim  160 mG/sulfamethoxazole 800 mG 1 Tablet(s) Oral daily    MEDICATIONS  (PRN):  acetaminophen   Tablet .. 325 milliGRAM(s) Oral every 4 hours PRN Temp greater or equal to 38C (100.4F)  aluminum hydroxide/magnesium hydroxide/simethicone Suspension 30 milliLiter(s) Oral every 6 hours PRN Dyspepsia  polyethylene glycol 3350 17 Gram(s) Oral daily PRN Constipation  zinc oxide 40% Ointment 1 Application(s) Topical every 8 hours PRN diaper dermatitis      REVIEW OF SYSTEMS:    CONSTITUTIONAL: No fever, chills, weight loss, or fatigue  HEENT: No sore throat, runny nose, ear ache  RESPIRATORY: No cough, wheezing, No shortness of breath  CARDIOVASCULAR: No chest pain, palpitations, dizziness  GASTROINTESTINAL: No abdominal pain. No nausea, vomiting, diarrhea  GENITOURINARY: No dysuria, increase frequency, hematuria, or incontinence  NEUROLOGICAL: No headaches, memory loss, loss of strength, numbness, or tremors, no weakness  EXTREMITY: No pedal edema BLE  SKIN: No itching, burning, rashes, or lesions     VITAL SIGNS:  T(C): 36.7 (12-10-18 @ 17:22), Max: 37.2 (12-10-18 @ 05:11)  T(F): 98 (12-10-18 @ 17:22), Max: 99 (12-10-18 @ 05:11)  HR: 87 (12-10-18 @ 17:22) (79 - 87)  BP: 100/68 (12-10-18 @ 17:22) (99/57 - 110/67)  RR: 17 (12-10-18 @ 17:22) (16 - 18)  SpO2: 99% (12-10-18 @ 17:22) (98% - 100%)  Wt(kg): --    PHYSICAL EXAM:    GENERAL: not in any distress  HEENT: Neck is supple, normocephalic, atraumatic   CHEST/LUNG: Clear to auscultation bilaterally; No rales, rhonchi, wheezing  HEART: Regular rate and rhythm; No murmurs, rubs, or gallops  ABDOMEN: Soft, Nontender, Nondistended; Bowel sounds present, no rebound   EXTREMITIES:  2+ Peripheral Pulses, No clubbing, cyanosis, or edema  GENITOURINARY:   SKIN: No rashes or lesions  BACK: no pressor sore   NERVOUS SYSTEM:  Alert & Oriented X3, Good concentration  PSYCH: normal affect     LABS:                         12.8   8.15  )-----------( 118      ( 10 Dec 2018 06:41 )             36.5     12-10    138  |  107  |  10  ----------------------------<  79  3.9   |  23  |  0.88    Ca    8.9      10 Dec 2018 06:41                                              Radiology: HPI:  58 year old male presents today accompanied with his daughter who reports that he has not been himself for the last three days, he appears very weak, confused and has poor appetite, at baseline pt is alert and oriented x 3 and independent, (-) chest pain (-) sob (_) nausea (-) vomiting (-) diarrhea (-) abdomional pain, pt did fall one week ago, his daughter woke up and found him on the floor lying on his back, he did not recall how he got there (01 Dec 2018 20:40)  all events noted   now has aids  discussed with wife on the phone today     Allergies    No Known Allergies    Intolerances        MEDICATIONS  (STANDING):  aspirin enteric coated 81 milliGRAM(s) Oral daily  cefpodoxime 200 milliGRAM(s) Oral every 12 hours  lactobacillus acidophilus 1 Tablet(s) Oral three times a day with meals  tenofovir alafenamide 10 mG/xqosnybclilz025 mG/cobicistat 150 mG/emtricitabine 200 mG (GENVOYA) 1 Tablet(s) Oral daily  trimethoprim  160 mG/sulfamethoxazole 800 mG 1 Tablet(s) Oral daily    MEDICATIONS  (PRN):  acetaminophen   Tablet .. 325 milliGRAM(s) Oral every 4 hours PRN Temp greater or equal to 38C (100.4F)  aluminum hydroxide/magnesium hydroxide/simethicone Suspension 30 milliLiter(s) Oral every 6 hours PRN Dyspepsia  polyethylene glycol 3350 17 Gram(s) Oral daily PRN Constipation  zinc oxide 40% Ointment 1 Application(s) Topical every 8 hours PRN diaper dermatitis      REVIEW OF SYSTEMS:    feels fine poor historian   no side effects from genvoya  VITAL SIGNS:  T(C): 36.7 (12-10-18 @ 17:22), Max: 37.2 (12-10-18 @ 05:11)  T(F): 98 (12-10-18 @ 17:22), Max: 99 (12-10-18 @ 05:11)  HR: 87 (12-10-18 @ 17:22) (79 - 87)  BP: 100/68 (12-10-18 @ 17:22) (99/57 - 110/67)  RR: 17 (12-10-18 @ 17:22) (16 - 18)  SpO2: 99% (12-10-18 @ 17:22) (98% - 100%)  Wt(kg): --    PHYSICAL EXAM:    GENERAL: not in any distress  HEENT: Neck is supple, normocephalic, atraumatic   CHEST/LUNG: Clear to auscultation bilaterally; No rales, rhonchi, wheezing  HEART: Regular rate and rhythm; No murmurs, rubs, or gallops  ABDOMEN: Soft, Nontender, Nondistended; Bowel sounds present, no rebound   EXTREMITIES:  2+ Peripheral Pulses, No clubbing, cyanosis, or edema  SKIN: No rashes or lesions  BACK: no pressor sore   NERVOUS SYSTEM:  Alert & Oriented X2; flat affect       LABS:                         12.8   8.15  )-----------( 118      ( 10 Dec 2018 06:41 )             36.5     12-10    138  |  107  |  10  ----------------------------<  79  3.9   |  23  |  0.88    Ca    8.9      10 Dec 2018 06:41                                              Radiology:

## 2018-12-11 RX ADMIN — Medication 1 TABLET(S): at 18:00

## 2018-12-11 RX ADMIN — Medication 81 MILLIGRAM(S): at 13:09

## 2018-12-11 RX ADMIN — ELVITEGRAVIR, COBICISTAT, EMTRICITABINE, AND TENOFOVIR ALAFENAMIDE 1 TABLET(S): 150; 150; 200; 10 TABLET ORAL at 13:09

## 2018-12-11 RX ADMIN — Medication 1 TABLET(S): at 13:09

## 2018-12-11 RX ADMIN — Medication 200 MILLIGRAM(S): at 05:23

## 2018-12-11 RX ADMIN — Medication 200 MILLIGRAM(S): at 18:00

## 2018-12-11 NOTE — PROGRESS NOTE ADULT - SUBJECTIVE AND OBJECTIVE BOX
Patient is a 58y old  Male who presents with a chief complaint of falls (11 Dec 2018 16:17)      INTERVAL HPI/OVERNIGHT EVENTS:  No new complaints.  Awaiting discharge.    MEDICATIONS  (STANDING):  aspirin enteric coated 81 milliGRAM(s) Oral daily  cefpodoxime 200 milliGRAM(s) Oral every 12 hours  lactobacillus acidophilus 1 Tablet(s) Oral three times a day with meals  tenofovir alafenamide 10 mG/eoouuaqsfhoj697 mG/cobicistat 150 mG/emtricitabine 200 mG (GENVOYA) 1 Tablet(s) Oral daily  trimethoprim  160 mG/sulfamethoxazole 800 mG 1 Tablet(s) Oral daily    MEDICATIONS  (PRN):  acetaminophen   Tablet .. 325 milliGRAM(s) Oral every 4 hours PRN Temp greater or equal to 38C (100.4F)  aluminum hydroxide/magnesium hydroxide/simethicone Suspension 30 milliLiter(s) Oral every 6 hours PRN Dyspepsia  polyethylene glycol 3350 17 Gram(s) Oral daily PRN Constipation  zinc oxide 40% Ointment 1 Application(s) Topical every 8 hours PRN diaper dermatitis      Allergies    No Known Allergies    Intolerances        REVIEW OF SYSTEMS:  CONSTITUTIONAL: No fever, weight loss, or fatigue  EYES: No eye pain, visual disturbances, or discharge  ENMT:  No difficulty hearing, tinnitus, vertigo; No sinus or throat pain  NECK: No pain or stiffness  BREASTS: No pain, masses, or nipple discharge  RESPIRATORY: No cough, wheezing, chills or hemoptysis; No shortness of breath  CARDIOVASCULAR: No chest pain, palpitations, dizziness, or leg swelling  GASTROINTESTINAL: No abdominal or epigastric pain. No nausea, vomiting, or hematemesis; No diarrhea or constipation. No melena or hematochezia.  GENITOURINARY: No dysuria, frequency, hematuria, or incontinence  NEUROLOGICAL: No headaches, memory loss, loss of strength, numbness, or tremors  SKIN: No itching, burning, rashes, or lesions   LYMPH NODES: No enlarged glands  ENDOCRINE: No heat or cold intolerance; No hair loss  MUSCULOSKELETAL: No joint pain or swelling; No muscle, back, or extremity pain  PSYCHIATRIC: No depression, anxiety, mood swings, or difficulty sleeping  HEME/LYMPH: No easy bruising, or bleeding gums  ALLERGY AND IMMUNOLOGIC: No hives or eczema    Vital Signs Last 24 Hrs  T(C): 36.8 (11 Dec 2018 17:33), Max: 37.1 (10 Dec 2018 23:25)  T(F): 98.2 (11 Dec 2018 17:33), Max: 98.7 (10 Dec 2018 23:25)  HR: 99 (11 Dec 2018 17:33) (79 - 99)  BP: 106/77 (11 Dec 2018 17:33) (93/54 - 106/77)  BP(mean): --  RR: 17 (11 Dec 2018 17:33) (17 - 18)  SpO2: 96% (11 Dec 2018 17:33) (96% - 99%)    PHYSICAL EXAM:  GENERAL: NAD, well-groomed, well-developed  HEAD:  Atraumatic, Normocephalic  EYES: EOMI, PERRLA, conjunctiva and sclera clear  ENMT: No tonsillar erythema, exudates, or enlargement; Moist mucous membranes, Good dentition, No lesions  NECK: Supple, No JVD, Normal thyroid  NERVOUS SYSTEM:  Alert & Oriented X3, Good concentration; Motor Strength 5/5 B/L upper and lower extremities; DTRs 2+ intact and symmetric  CHEST/LUNG: Clear to percussion bilaterally; No rales, rhonchi, wheezing, or rubs  HEART: Regular rate and rhythm; No murmurs, rubs, or gallops  ABDOMEN: Soft, Nontender, Nondistended; Bowel sounds present  EXTREMITIES:  2+ Peripheral Pulses, No clubbing, cyanosis, or edema  LYMPH: No lymphadenopathy noted  SKIN: No rashes or lesions    LABS:                        12.8   8.15  )-----------( 118      ( 10 Dec 2018 06:41 )             36.5     12-10    138  |  107  |  10  ----------------------------<  79  3.9   |  23  |  0.88    Ca    8.9      10 Dec 2018 06:41          CAPILLARY BLOOD GLUCOSE        CULTURES:    HEMOGLOBIN A1C:    CHOLESTEROL:        RADIOLOGY & ADDITIONAL TESTS:

## 2018-12-11 NOTE — PROGRESS NOTE ADULT - SUBJECTIVE AND OBJECTIVE BOX
HPI:  58 year old male presents today accompanied with his daughter who reports that he has not been himself for the last three days, he appears very weak, confused and has poor appetite, at baseline pt is alert and oriented x 3 and independent, (-) chest pain (-) sob (_) nausea (-) vomiting (-) diarrhea (-) abdomional pain, pt did fall one week ago, his daughter woke up and found him on the floor lying on his back, he did not recall how he got there (01 Dec 2018 20:40)  work up here has AIDS   started genvoya 2 days ago     Allergies    No Known Allergies    Intolerances        MEDICATIONS  (STANDING):  aspirin enteric coated 81 milliGRAM(s) Oral daily  cefpodoxime 200 milliGRAM(s) Oral every 12 hours  lactobacillus acidophilus 1 Tablet(s) Oral three times a day with meals  tenofovir alafenamide 10 mG/qejzvaslqsdk872 mG/cobicistat 150 mG/emtricitabine 200 mG (GENVOYA) 1 Tablet(s) Oral daily  trimethoprim  160 mG/sulfamethoxazole 800 mG 1 Tablet(s) Oral daily    MEDICATIONS  (PRN):  acetaminophen   Tablet .. 325 milliGRAM(s) Oral every 4 hours PRN Temp greater or equal to 38C (100.4F)  aluminum hydroxide/magnesium hydroxide/simethicone Suspension 30 milliLiter(s) Oral every 6 hours PRN Dyspepsia  polyethylene glycol 3350 17 Gram(s) Oral daily PRN Constipation  zinc oxide 40% Ointment 1 Application(s) Topical every 8 hours PRN diaper dermatitis      REVIEW OF SYSTEMS:  feels fine   CONSTITUTIONAL: No fever, chills, weight loss, or fatigue  HEENT: No sore throat, runny nose, ear ache  RESPIRATORY: No cough, wheezing, No shortness of breath  CARDIOVASCULAR: No chest pain, palpitations, dizziness  GASTROINTESTINAL: No abdominal pain. No nausea, vomiting, diarrhea  GENITOURINARY: No dysuria, increase frequency, hematuria, or incontinence  NEUROLOGICAL: No headaches, memory loss, loss of strength, numbness, or tremors, no weakness  EXTREMITY: No pedal edema BLE  SKIN: No itching, burning, rashes, or lesions     VITAL SIGNS:  T(C): 36.8 (12-11-18 @ 12:30), Max: 37.1 (12-10-18 @ 23:25)  T(F): 98.3 (12-11-18 @ 12:30), Max: 98.7 (12-10-18 @ 23:25)  HR: 88 (12-11-18 @ 12:30) (79 - 88)  BP: 99/67 (12-11-18 @ 12:30) (93/54 - 100/69)  RR: 18 (12-11-18 @ 12:30) (17 - 18)  SpO2: 97% (12-11-18 @ 12:30) (96% - 99%)  Wt(kg): --    PHYSICAL EXAM:    GENERAL: not in any distress  HEENT: Neck is supple, normocephalic, atraumatic   CHEST/LUNG: Clear to auscultation bilaterally; No rales, rhonchi, wheezing  HEART: Regular rate and rhythm; No murmurs, rubs, or gallops  ABDOMEN: Soft, Nontender, Nondistended; Bowel sounds present, no rebound   EXTREMITIES:  2+ Peripheral Pulses, No clubbing, cyanosis, or edema  SKIN: No rashes or lesions  BACK: no pressor sore   NERVOUS SYSTEM:  Alert & Oriented X2  PSYCH: normal affect     LABS:                         12.8   8.15  )-----------( 118      ( 10 Dec 2018 06:41 )             36.5     12-10    138  |  107  |  10  ----------------------------<  79  3.9   |  23  |  0.88    Ca    8.9      10 Dec 2018 06:41                                              Radiology:      < from: CT Chest No Cont (12.04.18 @ 14:43) >    IMPRESSION:     Lung base tree-in-bud opacities, possibly infectious or inflammatory.  Otherwise no lung consolidations.                    MIKA SARABIA M.D., ATTENDING RADIOLOGIST  This document has been electronically signed. Dec  4 2018  2:53PM                < end of copied text >

## 2018-12-11 NOTE — PROGRESS NOTE ADULT - SUBJECTIVE AND OBJECTIVE BOX
INTERVAL HPI:  58 year old male presented  accompanied with his daughter who reports that he has not been himself for  three days, he appeared  very weak, confused and reported to have  poor appetite. At baseline  is alert and oriented x 3 and independent, (-) chest pain (-) sob (_) nausea (-) vomiting (-) diarrhea (-) abdomional pain, pt did fall one week ago, his daughter woke up and found him on the floor lying on his back, he did not recall how he got there . So brought with altered mental status, found to have +ve HIV, E Coli UTI, Bacteremia and possible RLL pneumonia.  not able to provide details of history.     OVERNIGHT EVENTS:  comfortable.    Vital Signs Last 24 Hrs  T(C): 36.8 (11 Dec 2018 17:33), Max: 37.1 (10 Dec 2018 23:25)  T(F): 98.2 (11 Dec 2018 17:33), Max: 98.7 (10 Dec 2018 23:25)  HR: 99 (11 Dec 2018 17:33) (79 - 99)  BP: 106/77 (11 Dec 2018 17:33) (93/54 - 106/77)  BP(mean): --  RR: 17 (11 Dec 2018 17:33) (17 - 18)  SpO2: 96% (11 Dec 2018 17:33) (96% - 99%)    PHYSICAL EXAM:  GEN:         Awake, responsive and comfortable.  HEENT:    Normal.    RESP:       no wheezing.  CVS:          Regular rate and rhythm.   ABD:         Soft, non-tender, non-distended;     MEDICATIONS  (STANDING):  aspirin enteric coated 81 milliGRAM(s) Oral daily  cefpodoxime 200 milliGRAM(s) Oral every 12 hours  lactobacillus acidophilus 1 Tablet(s) Oral three times a day with meals  tenofovir alafenamide 10 mG/knbigtoaovws018 mG/cobicistat 150 mG/emtricitabine 200 mG (GENVOYA) 1 Tablet(s) Oral daily  trimethoprim  160 mG/sulfamethoxazole 800 mG 1 Tablet(s) Oral daily    MEDICATIONS  (PRN):  acetaminophen   Tablet .. 325 milliGRAM(s) Oral every 4 hours PRN Temp greater or equal to 38C (100.4F)  aluminum hydroxide/magnesium hydroxide/simethicone Suspension 30 milliLiter(s) Oral every 6 hours PRN Dyspepsia  polyethylene glycol 3350 17 Gram(s) Oral daily PRN Constipation  zinc oxide 40% Ointment 1 Application(s) Topical every 8 hours PRN diaper dermatitis    LABS:                        12.8   8.15  )-----------( 118      ( 10 Dec 2018 06:41 )             36.5     12-10    138  |  107  |  10  ----------------------------<  79  3.9   |  23  |  0.88    Ca    8.9      10 Dec 2018 06:41    ASSESSMENT AND PLAN:  ·	RLL Pneumonia.  ·	UTI with E COLI.  ·	Altered Mental Status.  ·	HIV/AIDS.  ·	Coagulase negative staph bacteremia    Pulmonary stable.  complete antibiotics per ID.

## 2018-12-11 NOTE — PROGRESS NOTE ADULT - ASSESSMENT
aids   discharge plan on po genvoya and bactrim acceptable   will follow   follow up in ID office in 4 weeks

## 2018-12-11 NOTE — PROGRESS NOTE ADULT - ASSESSMENT
Subjective Complaints:  Historian:         REVIEW OF SYSTEMS:  Eyes:  Good vision, no reported pain  ENT:  No sore throat, pain, runny nose, dysphagia  CV:  No pain, palpitatioins, hypo/hypertension  Resp:  No dyspnea, cough, tachypnea, wheezing  GI:  No pain, nausea, vomiting, diarrhea, constipatiion  Muscle:  No pain, weakness  Neuro:  No weakness, tingling, memory problems  Psych:  No fatigue, insomnia, mood problems, depression  Endocrine:  No polyuria, polydypsia, cold/heat intolerance    Vital Signs Last 24 Hrs  T(C): 36.8 (11 Dec 2018 12:30), Max: 37.1 (10 Dec 2018 23:25)  T(F): 98.3 (11 Dec 2018 12:30), Max: 98.7 (10 Dec 2018 23:25)  HR: 88 (11 Dec 2018 12:30) (79 - 88)  BP: 99/67 (11 Dec 2018 12:30) (93/54 - 100/69)  BP(mean): --  RR: 18 (11 Dec 2018 12:30) (17 - 18)  SpO2: 97% (11 Dec 2018 12:30) (96% - 99%)    GENERAL PHYSICAL EXAM:  General:  Appears stated age, well-groomed, well-nourished, no distress  HEENT:  NC/AT, patent nares w/ pink mucosa, OP clear w/o lesions, PERRL, EOMI, conjunctivae clear, no thyromegaly, nodules, adenopathy, no JVD  Chest:  Full & symmetric excursion, no increased effort, breath sounds clear  Cardiovascular:  Regular rhythm, S1, S2, no murmur/rub/S3/S4, no carotid/femoral/abdominal bruit, radial/pedal pulses 2+, no edema  Abdomen:  Soft, non-tender, non-distended, normoactive bowel sounds, no HSM  Extremities:  Gait & station:   Digits:   Nails:   Joints, Bones, Muscles:   ROM:   Stability:  Skin:  No rash/erythema/ecchymoses/petechiae/wounds/abscess/warm/dry  Musculoskeletal:  Full ROM in all joints w/o swelling/tenderness/effusion    NEUROLOGICAL EXAM:  HENT:  Normocephalic head; atraumatic head.  Neck supple.  ENT: normal looking.  Mental State:    Alert.  Fully oriented to person, place and date.  Coherent.  Speech clear and intact.  Cooperative.  Responds appropriately.    Cranial Nerves:  II-XII:   Pupils round and reactive to light and accommodation.  Extraocular movements full.  Visual fields full (no homonymous hemianopsia).  Visual acuity wnl.  Facial symmetry intact.  Tongue midline.  Motor Functions:  Moves all extremities.  No pronator drift of UE.  Claps hand well.  Hand  intact bilaterally.  Ambulatory.    Sensory Functions:   Intact to touch and pinprick to face and extremities.    Reflexes:  Deep tendon reflexes normoactive to biceps, knees and ankles.  Babinski absent (present).  Cerebellar Testing:    Finger to nose intact.  Nystagmus absent.  Neurovascular: Carotid auscultation full without bruits.      LABS:                        12.8   8.15  )-----------( 118      ( 10 Dec 2018 06:41 )             36.5     12-10    138  |  107  |  10  ----------------------------<  79  3.9   |  23  |  0.88    Ca    8.9      10 Dec 2018 06:41     ass= jeramy phillips speech  fluent  sitting in chair no seziure  unsteady gait hiv mri rosalva no acute path  uti         RADIOLOGY & ADDITIONAL STUDIES:        Recommendations: for rehab will follow

## 2018-12-12 RX ADMIN — Medication 200 MILLIGRAM(S): at 17:26

## 2018-12-12 RX ADMIN — Medication 1 TABLET(S): at 11:50

## 2018-12-12 RX ADMIN — ELVITEGRAVIR, COBICISTAT, EMTRICITABINE, AND TENOFOVIR ALAFENAMIDE 1 TABLET(S): 150; 150; 200; 10 TABLET ORAL at 11:50

## 2018-12-12 RX ADMIN — Medication 1 TABLET(S): at 09:08

## 2018-12-12 RX ADMIN — Medication 81 MILLIGRAM(S): at 11:50

## 2018-12-12 RX ADMIN — Medication 200 MILLIGRAM(S): at 05:39

## 2018-12-12 RX ADMIN — Medication 1 TABLET(S): at 17:26

## 2018-12-12 NOTE — PROGRESS NOTE ADULT - SUBJECTIVE AND OBJECTIVE BOX
Patient is a 58y old  Male who presents with a chief complaint of falls (11 Dec 2018 16:17)      INTERVAL HPI/OVERNIGHT EVENTS:  Patient seen and examined.  Awaiting transfer to [facility].  MEDICATIONS  (STANDING):  aspirin enteric coated 81 milliGRAM(s) Oral daily  cefpodoxime 200 milliGRAM(s) Oral every 12 hours  lactobacillus acidophilus 1 Tablet(s) Oral three times a day with meals  tenofovir alafenamide 10 mG/tvimyddfnmcu537 mG/cobicistat 150 mG/emtricitabine 200 mG (GENVOYA) 1 Tablet(s) Oral daily  trimethoprim  160 mG/sulfamethoxazole 800 mG 1 Tablet(s) Oral daily    MEDICATIONS  (PRN):  acetaminophen   Tablet .. 325 milliGRAM(s) Oral every 4 hours PRN Temp greater or equal to 38C (100.4F)  aluminum hydroxide/magnesium hydroxide/simethicone Suspension 30 milliLiter(s) Oral every 6 hours PRN Dyspepsia  polyethylene glycol 3350 17 Gram(s) Oral daily PRN Constipation  zinc oxide 40% Ointment 1 Application(s) Topical every 8 hours PRN diaper dermatitis      Allergies    No Known Allergies    Intolerances        REVIEW OF SYSTEMS:  CONSTITUTIONAL: No fever, weight loss, or fatigue  EYES: No eye pain, visual disturbances, or discharge  ENMT:  No difficulty hearing, tinnitus, vertigo; No sinus or throat pain  NECK: No pain or stiffness  BREASTS: No pain, masses, or nipple discharge  RESPIRATORY: No cough, wheezing, chills or hemoptysis; No shortness of breath  CARDIOVASCULAR: No chest pain, palpitations, dizziness, or leg swelling  GASTROINTESTINAL: No abdominal or epigastric pain. No nausea, vomiting, or hematemesis; No diarrhea or constipation. No melena or hematochezia.  GENITOURINARY: No dysuria, frequency, hematuria, or incontinence  NEUROLOGICAL: No headaches, memory loss, loss of strength, numbness, or tremors  SKIN: No itching, burning, rashes, or lesions   LYMPH NODES: No enlarged glands  ENDOCRINE: No heat or cold intolerance; No hair loss  MUSCULOSKELETAL: No joint pain or swelling; No muscle, back, or extremity pain  PSYCHIATRIC: No depression, anxiety, mood swings, or difficulty sleeping  HEME/LYMPH: No easy bruising, or bleeding gums  ALLERGY AND IMMUNOLOGIC: No hives or eczema    Vital Signs Last 24 Hrs  T(C): 36.7 (12 Dec 2018 13:29), Max: 36.8 (11 Dec 2018 17:33)  T(F): 98 (12 Dec 2018 13:29), Max: 98.2 (11 Dec 2018 17:33)  HR: 110 (12 Dec 2018 13:29) (85 - 110)  BP: 100/58 (12 Dec 2018 13:29) (100/58 - 106/77)  BP(mean): --  RR: 16 (12 Dec 2018 13:29) (16 - 17)  SpO2: 98% (12 Dec 2018 13:29) (96% - 98%)    PHYSICAL EXAM:  GENERAL: NAD, well-groomed, well-developed  HEAD:  Atraumatic, Normocephalic  EYES: EOMI, PERRLA, conjunctiva and sclera clear  ENMT: No tonsillar erythema, exudates, or enlargement; Moist mucous membranes, Good dentition, No lesions  NECK: Supple, No JVD, Normal thyroid  NERVOUS SYSTEM:  Alert & Oriented X3, Good concentration; Motor Strength 5/5 B/L upper and lower extremities; DTRs 2+ intact and symmetric  CHEST/LUNG: Clear to percussion bilaterally; No rales, rhonchi, wheezing, or rubs  HEART: Regular rate and rhythm; No murmurs, rubs, or gallops  ABDOMEN: Soft, Nontender, Nondistended; Bowel sounds present  EXTREMITIES:  2+ Peripheral Pulses, No clubbing, cyanosis, or edema  LYMPH: No lymphadenopathy noted  SKIN: No rashes or lesions    LABS:              CAPILLARY BLOOD GLUCOSE        CULTURES:    HEMOGLOBIN A1C:    CHOLESTEROL:        RADIOLOGY & ADDITIONAL TESTS:

## 2018-12-12 NOTE — PROGRESS NOTE ADULT - PROBLEM SELECTOR PLAN 2
MRI report pending.
MRI report pending.
MRI report appreciated.
MRI report pending.
MRI report pending.
eeg  ct head  neuro evaluation.
on antibiotics
MRI report appreciated.

## 2018-12-12 NOTE — PROGRESS NOTE ADULT - PROBLEM SELECTOR PLAN 3
CONTINUE AS PER id.
CONTINUE AS PER id.  Family to discuss with ID in am re:?HAART
CONTINUE AS PER id.  ?HAART
CONTINUE AS PER id.  Discussed with ID.  Started on HAART  Awaiting discharge
CONTINUE AS PER id.  Discussed with ID.  Started on HAART  Plan discharge
CONTINUE AS PER id.  Discussed with ID.  Started on HAART  Plan discharge
CONTINUE AS PER id.  Discussed with ID.  To be started on HAART  Plan discharge
CONTINUE AS PER id.  Family to discuss with ID  re:?HAART  Plan discharge
CONTINUE AS PER id.  Family to discuss with ID  re:?HAART  Plan discharge

## 2018-12-12 NOTE — PROGRESS NOTE ADULT - SUBJECTIVE AND OBJECTIVE BOX
INTERVAL HPI:  58 year old male presented  accompanied with his daughter who reports that he has not been himself for  three days, he appeared  very weak, confused and reported to have  poor appetite. At baseline  is alert and oriented x 3 and independent, (-) chest pain (-) sob (_) nausea (-) vomiting (-) diarrhea (-) abdomional pain, pt did fall one week ago, his daughter woke up and found him on the floor lying on his back, he did not recall how he got there . So brought with altered mental status, found to have +ve HIV, E Coli UTI, Bacteremia and possible RLL pneumonia.  not able to provide details of history.    OVERNIGHT EVENTS:  Out of bed to chair.    Vital Signs Last 24 Hrs  T(C): 35.7 (12 Dec 2018 17:16), Max: 36.7 (12 Dec 2018 13:29)  T(F): 96.2 (12 Dec 2018 17:16), Max: 98 (12 Dec 2018 13:29)  HR: 80 (12 Dec 2018 17:16) (80 - 110)  BP: 97/62 (12 Dec 2018 17:16) (97/62 - 102/68)  BP(mean): --  RR: 17 (12 Dec 2018 17:16) (16 - 17)  SpO2: 99% (12 Dec 2018 17:16) (97% - 99%)    PHYSICAL EXAM:  GEN:         Awake, responsive and comfortable.  HEENT:    Normal.    RESP:        no wheezing.  CVS:          Regular rate and rhythm.   ABD:         Soft, non-tender, non-distended;     MEDICATIONS  (STANDING):  aspirin enteric coated 81 milliGRAM(s) Oral daily  cefpodoxime 200 milliGRAM(s) Oral every 12 hours  lactobacillus acidophilus 1 Tablet(s) Oral three times a day with meals  tenofovir alafenamide 10 mG/mpdaakgbocne997 mG/cobicistat 150 mG/emtricitabine 200 mG (GENVOYA) 1 Tablet(s) Oral daily  trimethoprim  160 mG/sulfamethoxazole 800 mG 1 Tablet(s) Oral daily    MEDICATIONS  (PRN):  acetaminophen   Tablet .. 325 milliGRAM(s) Oral every 4 hours PRN Temp greater or equal to 38C (100.4F)  aluminum hydroxide/magnesium hydroxide/simethicone Suspension 30 milliLiter(s) Oral every 6 hours PRN Dyspepsia  polyethylene glycol 3350 17 Gram(s) Oral daily PRN Constipation  zinc oxide 40% Ointment 1 Application(s) Topical every 8 hours PRN diaper dermatitis    ASSESSMENT AND PLAN:  ·	RLL Pneumonia.  ·	UTI with E COLI.  ·	Altered Mental Status.  ·	HIV/AIDS.  ·	Coagulase negative staph bacteremia    Pulmonary stable.  complete antibiotics per ID.

## 2018-12-12 NOTE — PROGRESS NOTE ADULT - PROBLEM SELECTOR PLAN 1
Continue current treatment.  Urine cultures ecoli
rocephin 1 stuart daily.
neuro work up NEGATIVE
Continue current treatment.  Urine cultures ecoli

## 2018-12-12 NOTE — PROGRESS NOTE ADULT - PROBLEM SELECTOR PROBLEM 4
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

## 2018-12-12 NOTE — PROGRESS NOTE ADULT - REASON FOR ADMISSION
altered mental status.
dementia
dizziness
falls
hiv
hiv confused
hiv enecephalopathy
pneumonia
pneumonia
unsteady gait  hiv
weakernss dizziness  hx of hiv
fall
falls

## 2018-12-12 NOTE — PROGRESS NOTE ADULT - PROBLEM SELECTOR PROBLEM 2
Seizure
Seizure
AIDS
Seizure
UTI (urinary tract infection)
Seizure

## 2018-12-12 NOTE — PROGRESS NOTE ADULT - PROBLEM SELECTOR PLAN 4
continue bactrim as per ID

## 2018-12-12 NOTE — PROGRESS NOTE ADULT - PROBLEM SELECTOR PROBLEM 1
Urinary tract infection without hematuria, site unspecified
Seizure
Urinary tract infection without hematuria, site unspecified
Seizure
Urinary tract infection without hematuria, site unspecified

## 2018-12-12 NOTE — PROGRESS NOTE ADULT - ASSESSMENT
Subjective Complaints:  Historian:         REVIEW OF SYSTEMS:  Eyes:  Good vision, no reported pain  ENT:  No sore throat, pain, runny nose, dysphagia  CV:  No pain, palpitatioins, hypo/hypertension  Resp:  No dyspnea, cough, tachypnea, wheezing  GI:  No pain, nausea, vomiting, diarrhea, constipatiion  Muscle:  No pain, weakness  Neuro:  No weakness, tingling, memory problems  Psych:  No fatigue, insomnia, mood problems, depression  Endocrine:  No polyuria, polydypsia, cold/heat intolerance    Vital Signs Last 24 Hrs  T(C): 35.7 (12 Dec 2018 17:16), Max: 36.7 (12 Dec 2018 13:29)  T(F): 96.2 (12 Dec 2018 17:16), Max: 98 (12 Dec 2018 13:29)  HR: 80 (12 Dec 2018 17:16) (80 - 110)  BP: 97/62 (12 Dec 2018 17:16) (97/62 - 102/68)  BP(mean): --  RR: 17 (12 Dec 2018 17:16) (16 - 17)  SpO2: 99% (12 Dec 2018 17:16) (97% - 99%)    GENERAL PHYSICAL EXAM:  General:  Appears stated age, well-groomed, well-nourished, no distress  HEENT:  NC/AT, patent nares w/ pink mucosa, OP clear w/o lesions, PERRL, EOMI, conjunctivae clear, no thyromegaly, nodules, adenopathy, no JVD  Chest:  Full & symmetric excursion, no increased effort, breath sounds clear  Cardiovascular:  Regular rhythm, S1, S2, no murmur/rub/S3/S4, no carotid/femoral/abdominal bruit, radial/pedal pulses 2+, no edema  Abdomen:  Soft, non-tender, non-distended, normoactive bowel sounds, no HSM  Extremities:  Gait & station:   Digits:   Nails:   Joints, Bones, Muscles:   ROM:   Stability:  Skin:  No rash/erythema/ecchymoses/petechiae/wounds/abscess/warm/dry  Musculoskeletal:  Full ROM in all joints w/o swelling/tenderness/effusion    NEUROLOGICAL EXAM:  HENT:  Normocephalic head; atraumatic head.  Neck supple.  ENT: normal looking.  Mental State:    Alert.  Fully oriented to person, place and date.  Coherent.  Speech clear and intact.  Cooperative.  Responds appropriately.    Cranial Nerves:  II-XII:   Pupils round and reactive to light and accommodation.  Extraocular movements full.  Visual fields full (no homonymous hemianopsia).  Visual acuity wnl.  Facial symmetry intact.  Tongue midline.  Motor Functions:  Moves all extremities.  No pronator drift of UE.  Claps hand well.  Hand  intact bilaterally.  Ambulatory.    Sensory Functions:   Intact to touch and pinprick to face and extremities.    Reflexes:  Deep tendon reflexes normoactive to biceps, knees and ankles.  Babinski absent (present).  Cerebellar Testing:    Finger to nose intact.  Nystagmus absent.  Neurovascular: Carotid auscultation full without bruits.      LABS:     ass= awae alert  speech fluent arm leg 4/5 unsteady gait hiv  unsteady gait for pt             RADIOLOGY & ADDITIONAL STUDIES:        Recommendations: for rehab  s/p pneumonia

## 2018-12-13 VITALS
OXYGEN SATURATION: 97 % | HEART RATE: 83 BPM | TEMPERATURE: 97 F | DIASTOLIC BLOOD PRESSURE: 63 MMHG | SYSTOLIC BLOOD PRESSURE: 101 MMHG | RESPIRATION RATE: 17 BRPM

## 2018-12-13 LAB
ABACAVIR ISLT GENOTYP: SIGNIFICANT CHANGE UP
ATAZANAVIR+RITONAVIR ISLT GENOTYP: SIGNIFICANT CHANGE UP
DARUNAVIR+RITONAVIR ISLT GENOTYP: SIGNIFICANT CHANGE UP
DIDANOSINE ISLT GENOTYP: SIGNIFICANT CHANGE UP
EFAVIRENZ ISLT GENOTYP: SIGNIFICANT CHANGE UP
EMTRICITABINE ISLT GENOTYP: SIGNIFICANT CHANGE UP
ETRAVIRINE ISLT GENOTYP: SIGNIFICANT CHANGE UP
FOSAMPRENAVIR+RITONAVIR ISLT GENOTYP: SIGNIFICANT CHANGE UP
HIV NNRTI GENE MUT DET ISLT: SIGNIFICANT CHANGE UP
HIV NRTI GENE MUT DET ISLT: SIGNIFICANT CHANGE UP
HIV PI GENE MUT DET ISLT: SIGNIFICANT CHANGE UP
HIV RT+PR MUT TESTED ISLT: SIGNIFICANT CHANGE UP
INDINAVIR+RITONAVIR ISLT GENOTYP: SIGNIFICANT CHANGE UP
LAMIVUDINE ISLT GENOTYP: SIGNIFICANT CHANGE UP
LOPINAVIR+RITONAVIR ISLT GENOTYP: SIGNIFICANT CHANGE UP
NELFINAVIR ISLT GENOTYP: SIGNIFICANT CHANGE UP
NEVIRAPINE ISLT GENOTYP: SIGNIFICANT CHANGE UP
RILPIVIRINE ISLT GENOTYP: SIGNIFICANT CHANGE UP
SAQUINAVIR+RITONAVIR ISLT GENOTYP: SIGNIFICANT CHANGE UP
STAVUDINE ISLT GENOTYP: SIGNIFICANT CHANGE UP
TENOFOVIR ISLT GENOTYP: SIGNIFICANT CHANGE UP
TIPRANAVIR+RITONAVIR ISLT GENOTYP: SIGNIFICANT CHANGE UP
ZIDOVUDINE ISLT GENOTYP: SIGNIFICANT CHANGE UP

## 2018-12-13 RX ADMIN — Medication 1 TABLET(S): at 13:19

## 2018-12-13 RX ADMIN — Medication 200 MILLIGRAM(S): at 06:00

## 2018-12-13 RX ADMIN — Medication 1 TABLET(S): at 08:44

## 2018-12-13 RX ADMIN — ELVITEGRAVIR, COBICISTAT, EMTRICITABINE, AND TENOFOVIR ALAFENAMIDE 1 TABLET(S): 150; 150; 200; 10 TABLET ORAL at 13:18

## 2018-12-13 RX ADMIN — Medication 81 MILLIGRAM(S): at 13:18

## 2018-12-13 NOTE — PROGRESS NOTE ADULT - PROVIDER SPECIALTY LIST ADULT
Infectious Disease
Internal Medicine
Neurology
Pulmonology
Internal Medicine

## 2018-12-13 NOTE — PROGRESS NOTE ADULT - SUBJECTIVE AND OBJECTIVE BOX
INTERVAL HPI:   58 year old male presented  accompanied with his daughter who reports that he has not been himself for  three days, he appeared  very weak, confused and reported to have  poor appetite. At baseline  is alert and oriented x 3 and independent, (-) chest pain (-) sob (_) nausea (-) vomiting (-) diarrhea (-) abdomional pain, pt did fall one week ago, his daughter woke up and found him on the floor lying on his back, he did not recall how he got there . So brought with altered mental status, found to have +ve HIV, E Coli UTI, Bacteremia and possible RLL pneumonia.  not able to provide details of history.    OVERNIGHT EVENTS:  Comfortable    Vital Signs Last 24 Hrs  T(C): 36.6 (13 Dec 2018 11:52), Max: 36.6 (13 Dec 2018 11:52)  T(F): 97.8 (13 Dec 2018 11:52), Max: 97.8 (13 Dec 2018 11:52)  HR: 60 (13 Dec 2018 11:52) (60 - 83)  BP: 95/69 (13 Dec 2018 11:52) (95/69 - 113/62)  BP(mean): --  RR: 17 (13 Dec 2018 11:52) (16 - 17)  SpO2: 98% (13 Dec 2018 11:52) (96% - 100%)    PHYSICAL EXAM:  GEN:         Awake, responsive and comfortable.  HEENT:    Normal.    RESP:       no wheezing.  CVS:             Regular rate and rhythm.   ABD:         Soft, non-tender, non-distended;     MEDICATIONS  (STANDING):  aspirin enteric coated 81 milliGRAM(s) Oral daily  cefpodoxime 200 milliGRAM(s) Oral every 12 hours  lactobacillus acidophilus 1 Tablet(s) Oral three times a day with meals  tenofovir alafenamide 10 mG/cysopmzvllgx105 mG/cobicistat 150 mG/emtricitabine 200 mG (GENVOYA) 1 Tablet(s) Oral daily  trimethoprim  160 mG/sulfamethoxazole 800 mG 1 Tablet(s) Oral daily    MEDICATIONS  (PRN):  acetaminophen   Tablet .. 325 milliGRAM(s) Oral every 4 hours PRN Temp greater or equal to 38C (100.4F)  aluminum hydroxide/magnesium hydroxide/simethicone Suspension 30 milliLiter(s) Oral every 6 hours PRN Dyspepsia  polyethylene glycol 3350 17 Gram(s) Oral daily PRN Constipation  zinc oxide 40% Ointment 1 Application(s) Topical every 8 hours PRN diaper dermatitis      ASSESSMENT AND PLAN:  ·	RLL Pneumonia.  ·	UTI with E COLI.  ·	Altered Mental Status.  ·	HIV/AIDS.  ·	Coagulase negative staph bacteremia    Pulmonary improved and stable

## 2018-12-18 DIAGNOSIS — N39.0 URINARY TRACT INFECTION, SITE NOT SPECIFIED: ICD-10-CM

## 2018-12-18 DIAGNOSIS — R56.9 UNSPECIFIED CONVULSIONS: ICD-10-CM

## 2018-12-18 DIAGNOSIS — B96.20 UNSPECIFIED ESCHERICHIA COLI [E. COLI] AS THE CAUSE OF DISEASES CLASSIFIED ELSEWHERE: ICD-10-CM

## 2018-12-18 DIAGNOSIS — Z87.891 PERSONAL HISTORY OF NICOTINE DEPENDENCE: ICD-10-CM

## 2018-12-18 DIAGNOSIS — Z79.82 LONG TERM (CURRENT) USE OF ASPIRIN: ICD-10-CM

## 2018-12-18 DIAGNOSIS — Z91.81 HISTORY OF FALLING: ICD-10-CM

## 2018-12-18 DIAGNOSIS — B59 PNEUMOCYSTOSIS: ICD-10-CM

## 2018-12-18 DIAGNOSIS — B20 HUMAN IMMUNODEFICIENCY VIRUS [HIV] DISEASE: ICD-10-CM

## 2018-12-18 DIAGNOSIS — R29.6 REPEATED FALLS: ICD-10-CM

## 2018-12-18 DIAGNOSIS — R41.841 COGNITIVE COMMUNICATION DEFICIT: ICD-10-CM

## 2018-12-18 LAB
DOLUTEGRAVIR ISLT GENOTYP: SIGNIFICANT CHANGE UP
ELVITEGRAVIR ISLT GENOTYP: SIGNIFICANT CHANGE UP
RALTEGRAVIR ISLT GENOTYP: SIGNIFICANT CHANGE UP

## 2019-01-22 NOTE — PATIENT PROFILE ADULT - NSPROHMSYMPCOND_GEN_A_NUR
Was talking to mother about another child and she expressed concern about osmarie  Mother feels she is very skinny and a very picky eater  Mother wants to be  Be referred to nutritionist   Does not want to wait until >2/16/19 when well is due   Made an appt with sibling on 1/24 at 46pm none

## 2019-12-30 NOTE — ED ADULT NURSE NOTE - PMH
Subjective: Follow up for idiopathic short stature    History of Rubinstein-Taybi syndrome    History of present illness: Antoinette is a 9  y.o. 7  m.o. female who has been followed in endocrine clinic since 10/10/2017 for short stature. She was present today with her mother. Antoinette has a history of language delay for which she has seen several specialist including genetics. She follows with developmental pediatrics at [facility]. Also has history of ADHD and functional constipation with acquired megacolon . Referred to [facility] for evaluation for possible endocrine causes of ID/short stature. Denied headche,tiredness, diarrhea,heat/cold intolerance,vomiting, polyuria,polydipsia. Labs done in 10/2017 were significant for normal H/H, normal CMP, normal thyroid studies, low normal IgF-1 and low to midnormal IgF-BP3. Due to continual slow growth with annualized GV of 3.8cm/year which is below normal for prepubertal girls she had a GH stim test done on 4/4/2018. 2 agent stim test (arginine and levodopa had a peak of 11.1ng/ml. She also had a normal cortisol. Most recent height is 2.77SD below the mean. She also had a normal rashid MRI on 7/24/2018. With height of >-2.25SD below the mean we applied for growth hormone for idiopathic short stature but she was denied. She was again denied on appeal. Started zomacton in 12/2018. Started zomacton in 12/2018. Currently on 1.0mg daily (0.25mg/kg/week). Diagnosed with Rubinstien -Taybi syndrome in 2/2019. Follows with developmental clinic at [facility]. Family report other evaluation have so far been negative. Her last visit in endocrine clinic was on 8/9/2019. Since then, she has been in good health, with no significant illnesses. Good interval growth in height. Screening labs done at that visit were significant for mid normal IGF-I. Growth hormone dose was increased to 1.1 mg daily [0.24 mg/kg/week. She also had normal thyroid studies. Denies headache,tiredness, problems with peripheral vision,constipation/diarrhea,heat/cold intolerance,polyuria,polydipsia or hip pain        Past Medical History:   Diagnosis Date    ADD (attention deficit disorder)     Asthma     Central auditory processing disorder (CAPD) 06/2017    Ear infection     Environmental allergies     Functional constipation 8/1/2016    Musculoskeletal disorder 02/2018    broken collar bone    Pneumonia     Premature infant     Rubinstein-Taybi syndrome     Sensory processing difficulty 12/2017       Social History:  Antoinette is going into the 4th grade. Activities: none    Review of Systems:    A comprehensive review of systems was negative except for that written in the HPI. Medications:  Current Outpatient Medications   Medication Sig    fluticasone propionate (FLONASE) 50 mcg/actuation nasal spray 1 Spray.  ZOMACTON 10 mg solr 1.1 mg by SubCUTAneous route daily.  Insulin Needles, Disposable, (GAMA PEN NEEDLE) 32 gauge x 5/32\" ndle Use to inject [address] daily.  guanFACINE IR (TENEX) 1 mg IR tablet 2 mg.  sennosides (EX-LAX) 15 mg chewable tablet Take  by mouth. 1 to 2 per day as needed    cetirizine (ZYRTEC) 5 mg/5 mL solution Take 5 mg by mouth daily.  montelukast (SINGULAIR) 5 mg chewable tablet Take 5 mg by mouth nightly.  albuterol sulfate (PROVENTIL;VENTOLIN) 2.5 mg/0.5 mL Nebu 2.5 mg by Nebulization route as needed.  budesonide (PULMICORT) 0.5 mg/2 mL nebulizer suspension 500 mcg by Nebulization route as needed.  TRIAMCINOLONE ACETONIDE (NASACORT NA) by Nasal route. No current facility-administered medications for this visit. Allergies: Allergies   Allergen Reactions    Milk Hives    Whey Protein Concentrate Hives     Can have things that are made with milk but may not drink in raw per mom .             Objective:       Visit Vitals  /66 (BP 1 Location: Right arm, BP Patient Position: Sitting)   Pulse 81   Temp 98.6 °F (37 °C) (Oral)   Resp 19   Ht (!) 4' 3.46\" (1.307 m)   Wt 68 lb 9.6 oz (31.1 kg)   SpO2 97%   BMI 18.22 kg/m²       Height: 6 %ile (Z= -1.55) based on CDC (Girls, 2-20 Years) Stature-for-age data based on Stature recorded on 12/30/2019. Weight: 24 %ile (Z= -0.69) based on CDC (Girls, 2-20 Years) weight-for-age data using vitals from 12/30/2019. BMI: Body mass index is 18.22 kg/m². Percentile: 65 %ile (Z= 0.38) based on CDC (Girls, 2-20 Years) BMI-for-age based on BMI available as of 12/30/2019. Change in weight: + 3.4 kg in last 4months  Change in height: 3.8cm in last 4months, GV: 9.7cm/yr    On exam:  Gen: Well appearing, not in acute distress  HEENT: NC/AT,MMM, thyroid not palpable  Chest CTA B/L  CVS: RR  Abdomen: soft , no masses palpable, BS + and of normal pitch  CNS: AOx3  Puberty: holli 2 breast    Laboratory data:  Results for orders placed or performed in visit on 08/09/19   INSULIN-LIKE GROWTH FACTOR 1   Result Value Ref Range    Insulin-Like Growth Factor I 189 80 - 400 ng/mL   T4, FREE   Result Value Ref Range    T4, Free 1.23 0.90 - 1.67 ng/dL   TSH 3RD GENERATION   Result Value Ref Range    TSH 1.320 0.600 - 4.840 uIU/mL       Bone age: Bone age xray done on 8/9/2019 at [facility] of 10yrs 3months was 10yrs (normal). Assessment:       Antoinette is a 9  y.o. 7  m.o. female presenting for follow up of idiopathic short stature. Started [address] in 12/2018. She had good interval growth in height with annualized GV of 9.7cm/yr which is very impressive. Currently on zomacton 1.1mg daily(0.24mg/kg/week). Again reviewed the side effects of [address] treatment including: pseudotumor cerebri (benign intracranial hypertension); SCFE; hypothyroidism. They will contact me for concerns over head ache or leg pain. Puberty: Exam at the last clinic visit showed that Antoinette has started puberty which at the age of 10yrs 3months is normal.We would continue to monitor her growth and development. Follow up in clinic in 4months or sooner if any concerns.        Plan: Reviewed growth charts with mum  Diagnosis, etiology, pathophysiology, risk/ benefits of rx, proposed eval, and expected follow up discussed with family and all questions answered      Total time: 30minutes  Time spent counseling patient/family: 50% No pertinent past medical history

## 2021-01-01 NOTE — ED ADULT NURSE NOTE - CADM POA CENTRAL LINE
[Name] : Name: [unfilled] [] : : ~~ [Dear  ___:] : Dear Dr. [unfilled]: No [Consult] : I had the pleasure of evaluating your patient, [unfilled]. My full evaluation follows. [Consult - Single Provider] : Thank you very much for allowing me to participate in the care of this patient. If you have any questions, please do not hesitate to contact me. [Sincerely,] : Sincerely, [___] : [unfilled] [FreeTextEntry9] : 2021 [FreeTextEntry4] : Rahul Smith MD [FreeTextEntry5] : [phone] [FreeTextEntry6] : [place] [address] [FreeTextEntry1] : 2021 [de-identified] : Ousmane Wheat MD\par Attending Physician, Pediatric Cardiology\par [facility]\par \par

## 2021-04-09 NOTE — DISCHARGE NOTE ADULT - LEARNING BEHAVIORAL ACTIVITIES TO COPE WITH URGES. FOR EXAMPLE, DISTRACTION AND CHANGING ROUTINES.
Assessment/Plan:     Type 2 diabetes mellitus without complication, without long-term current use of insulin ([facility])    Lab Results   Component Value Date    HGBA1C 12 4 (H) 04/03/2021     · New onset with blood sugars in the 300's during most recent hospitalization  · She was initially started on lantus 25 units at HS, but transitioned to oral agents due to her vision issues  · Pt was discussed with endocrinologist by hospitalist  · Will continue metformin 500mg po BID and Januvia 50mg PO Daily  · Endocrinology referral ordered  · Nutrition referral ordered  · Glucometer, Test strips, lancets ordered for the patient  · Neurontin increased to 300mg PO TID from BID  · Patient continues with prednisone 5mg po BID for her COPD  · Attempted to provide education to patient - was met with significant resistance          Chronic diastolic CHF (congestive heart failure) ([facility])  Wt Readings from Last 3 Encounters:   04/07/21 61 kg (134 lb 7 7 oz)   03/02/21 64 3 kg (141 lb 12 8 oz)   02/17/21 65 3 kg (144 lb)     · Patient states unable to weigh herself at home a few days prior to her hospitalization secondary to her back pain  · Continue Lasix, Metoprolol        History of recent hospitalization  · Recent hospitalization 4/3/2021 to 4/7/2021  · Follow-up PCP - 4/9/2021  · Follow-up pain management - 4/9/2021 appointment   · Follow-up with endocrinology/DM education - referrals placed  · Referral to palliative care service - referral placed      Gastroesophageal reflux disease without esophagitis  · Continue prn simethicone, protonix,     COPD (chronic obstructive pulmonary disease) ([address] )  · Patient now on chronic 3 L NC  · Continue prednisone 5mg PO BID, symbicort, trelegy, duoneb, singulair and theophylline     Chronic respiratory failure with hypoxia ([address] )  · Patient with chronic COPD  · Continues on her Home 3 L NC  · While in the office, her Portable Oxygen Machine turned off - both patient and friend stated that machine was 100% charged when they left the house to come to the appointment  · Patient was placed on Office oxygen tank at 3 L   · Patient stable to taken off office tank and go to next appointment          Paroxysmal atrial fibrillation ([address] )  · Continue with Eliquis        Lumbar radiculopathy  · 4/3/2021 Xray Lumbar Spine:    · The bones are demineralized  Multiple compression fractures including T12, L1, L2, and L4 appear grossly similar in extent to prior CT 2/24/2020 accounting for differences in technique  · Lumbar levoscoliosis  · Degenerative changes of the lumbar spine characterized by anterior endplate osteophytes and loss of disc height most pronounced at L1-L2  · There are atherosclerotic calcifications  Soft tissues are otherwise unremarkable  · Patient receives Depo-Medrol injections for Sacroillitis by Dr Soledad Villanueva  - scheduled next on 4/9/2021  · Previous Radiology:   · 2/23/2021 CT Lumbar Spine:   · VERTEBRAL BODIES:     · L1 compression deformity with 20% loss of height      · T12 compression deformity 15% loss of height      · Stable L4 compression deformity    · DEGENERATIVE CHANGES:   · Lower Thoracic spine: Moderate multilevel degenerative changes    · PARASPINAL SOFT TISSUES: Larger simple renal cyst is seen  Diverticulosis without evidence for diverticulitis   Otherwise normal                Chronic pain syndrome  · Chronic low back issues - see Lumbar Radiculopathy Section  · Supportive measures  · Follow up on 4/9/2021 with pain mangement  · Continue fosamax, calcium +Vitamin D, Robaxin  · Administer Toradol 30mg IM injection for severe back pain rated 10/10  · Will order her Norco for at home, as Percocet not working for her  · Patient advised and educated, as well as verbally repeated that she is not to take both percocet and norco            Ambulatory dysfunction  · Patient with worsening ability to ambulate due to chronic compression fractures  · Patient recently hospitalized for this issue  · PT/OT recommended short term rehab, patient refused  · [facility] ordered for the patient          Diagnoses and all orders for this visit:    History of recent hospitalization    Type 2 diabetes mellitus without complication, without long-term current use of insulin ([facility])  -     POCT hemoglobin A1c  -     Glucometer  -     Glucometer test strips  -     Lancets  -     metFORMIN (GLUCOPHAGE) 500 mg tablet; Take 1 tablet (500 mg total) by mouth 2 (two) times a day with meals    Ambulatory dysfunction    Chronic respiratory failure with hypoxia ([facility])    Mixed simple and mucopurulent chronic bronchitis (HCC)    Chronic diastolic CHF (congestive heart failure) (HCC)    Paroxysmal atrial fibrillation (HCC)    Chronic pain syndrome  -     HYDROcodone-acetaminophen (NORCO) 5-325 mg per tablet; Take 1 tablet by mouth every 6 (six) hours as needed for painMax Daily Amount: 4 tablets  -     ketorolac (TORADOL) injection 30 mg    Gastroesophageal reflux disease without esophagitis    Lumbar radiculopathy         Subjective:     Patient ID: Betty Fitzpatrick is a 81 y o  female  Patient here for transition of care due to recent hospitalization from 4/3/2021 to 4/7/2021 secondary to worsening back pain and difficulty ambulating  She has a history significant for multiple lumbar compression fractures  She stated that she believes she may have re-injured her back while getting off of a bus the other day  She denies any falls or traumatic injury  She denies urinary or bowel incontinence saddle anesthesia numbness in the legs  She was also found to have new onset with blood sugars in the 300's, and HgBA1c of 12 4  She was initially started on lantus 25 units at , but transitioned to oral agents due to her vision issues  Pt was discussed with endocrinologist by hospitalist  Will continue metformin 500mg po BID and Januvia 50mg PO Daily  Endocrinology referral ordered  Nutrition referral ordered   Glucometer, Test strips, lancets ordered for the patient  Neurontin increased to 300mg PO TID from BID  She did not have a COPD exacerbation, but was increased from her 2 L NC to 3 L NC, and has been informed that she will require this 3 L NC to be worn at all times  Patient continues with prednisone 5mg po BID for her COPD  Attempted to provide education to patient - was met with significant resistance as patient is preoccupied with severe back pain, and how quickly I will be able to get her out of our appointment as she has a 945 am appointment with pain management for a back injection  I also attempted to discuss printing out information, for which she stated "I cant see to read it anyway, don't bother"  Review of Systems   Constitutional: Negative for activity change, chills, fatigue and fever  HENT: Positive for hearing loss  Negative for rhinorrhea and sore throat  Eyes: Positive for visual disturbance  Negative for pain  Respiratory: Positive for shortness of breath  Negative for cough  Cardiovascular: Negative for chest pain, palpitations and leg swelling  Gastrointestinal: Negative for abdominal pain, constipation, diarrhea, nausea and vomiting  Genitourinary: Negative for difficulty urinating, flank pain, frequency and urgency  Musculoskeletal: Positive for back pain  Negative for gait problem, joint swelling and myalgias  Skin: Negative for color change  Neurological: Negative for dizziness, weakness, light-headedness and headaches  Psychiatric/Behavioral: Negative for sleep disturbance  The patient is not nervous/anxious  All other systems reviewed and are negative  Objective:     Physical Exam  Vitals signs reviewed  Constitutional:       General: She is awake  Appearance: Normal appearance  She is well-developed  HENT:      Head: Normocephalic and atraumatic        Nose: Nose normal       Mouth/Throat:      Mouth: Mucous membranes are moist    Eyes: Extraocular Movements: Extraocular movements intact  Comments: + macular degeneration    Neck:      Musculoskeletal: Normal range of motion  Cardiovascular:      Rate and Rhythm: Normal rate and regular rhythm  Pulses: Normal pulses  Heart sounds: Normal heart sounds  Pulmonary:      Effort: Accessory muscle usage present  Breath sounds: Examination of the right-lower field reveals wheezing  Examination of the left-lower field reveals wheezing  Wheezing present  Comments: 3 L NC  Abdominal:      General: Bowel sounds are normal       Palpations: Abdomen is soft  Musculoskeletal:      Lumbar back: She exhibits decreased range of motion, tenderness and pain  Right lower leg: No edema  Left lower leg: No edema  Comments: Patient complains of 10/10 low back pain  Skin:     General: Skin is warm and dry  Neurological:      Mental Status: She is alert and oriented to person, place, and time  Psychiatric:         Attention and Perception: Attention normal          Mood and Affect: Mood is anxious  Speech: Speech normal          Behavior: Behavior normal  Behavior is cooperative  Vitals:    04/09/21 0857   BP: 110/70   BP Location: Left arm   Patient Position: Sitting   Cuff Size: Standard   Pulse: 72   Resp: 16   Temp: (!) 97 4 °F (36 3 °C)   SpO2: 92%   Height: 5' (1 524 m)       Transitional Care Management Review:  Yanira Samuels is a 81 y o  female here for TCM follow up       During the TCM phone call patient stated:    TCM Call (since 3/9/2021)     Date and time call was made  4/8/2021  7:52 AM    Hospital care reviewed  Records reviewed    Patient was hospitialized at  [address]        Date of Admission  04/03/21    Date of discharge  04/07/21    Diagnosis  Ambulatory dysfunction    Disposition  Home    Were the patients medications reviewed and updated  Yes    Current Symptoms  Weakness    Weakness severity  Mild      TCM Call (since 3/9/2021)     Post hospital issues  None    Should patient be enrolled in anticoag monitoring? No    Scheduled for follow up?   Yes    Did you obtain your prescribed medications  Yes    Do you need help managing your prescriptions or medications  No    Is transportation to your appointment needed  No    I have advised the patient to call PCP with any new or worsening symptoms  LEEROY Rainey Statement Selected

## 2023-09-09 NOTE — PROGRESS NOTE ADULT - NSHPATTENDINGPLANDISCUSS_GEN_ALL_CORE
[facility] Institutional Assessment     Institutional Assessment for Health Risk Assessment with Yue L Felipe completed on August 30, 2023 at [facility].    Type of residence:: Nursing home  Current living arrangement:: I live in a nursing home     Assessment completed with:: Patient, Care Team Member    Mental/Behavioral Health   Depression Screening: No concerns      Mental health DX:: Yes   Mental health DX how managed:: Medication    Falls Assessment:   Fallen 2 or more times in the past year?: No   Any fall with injury in the past year?: No    ADL/IADL Dependencies:   Dependent ADLs:: Bathing, Dressing, Grooming, Toileting, Wheelchair-with assist, Transfers, Incontinence  Dependent IADLs:: Cleaning, Transportation, Incontinence, Cooking, Laundry, Shopping, Meal Preparation, Medication Management, Money Management      Care Plan & Recommendations: CC met with Yue in his room at [facility] on this date.  He was confused in his room, and mumbled some words about not knowing if it was time to leave the room or not.  CC spoke to floor aide, she said she would check on him, and told Yue this to reassure him.  Yue was pleasant, he appeared to be comfortable.  Per  Patricia Roger he has been adjusting well, and has not had any falls or behavior concerns.  No needs or concerns at this time.  Per NP Marysol Clayton she believes hospice could benefit him at some point as he could benefit form comfort cares, and will continue to present this to family.  No other needs at this time. Discussed options/opportunities for transitions.    See Institutional Care Plan for detailed assessment information.    Obtained a copy of the facility care plan and MDS from facility electronic records. Requested of intermediate social worker to put this care coordinator on care conference attendee list.    Placed the Health Plan facility face sheet in the member's facility chart.    Follow-Up Plan: Member  informed of future contact, plan to f/u with member with a 6 month assessment, attend 1 care conference annually, and will follow any hospitalizations or transitions. Care Coordinator contact information shared with member/family and facility, and encouraged to call this care coordinator with any questions or concerns at any time.     [place] care continuum providers: Please see Snapshot and Care Management Flowsheets for Specific details of care plan.    This CC note routed to PCP, Tanya Washington.     XANDER Prather, [facility]  [phone]     patient at [facility]

## 2023-11-07 ENCOUNTER — EMERGENCY (EMERGENCY)
Facility: HOSPITAL | Age: 63
LOS: 0 days | Discharge: HOME | End: 2023-11-07
Payer: MEDICAID

## 2023-11-07 VITALS
OXYGEN SATURATION: 94 % | HEART RATE: 98 BPM | SYSTOLIC BLOOD PRESSURE: 184 MMHG | DIASTOLIC BLOOD PRESSURE: 78 MMHG | WEIGHT: 199.96 LBS | RESPIRATION RATE: 20 BRPM | HEIGHT: 68 IN | TEMPERATURE: 98 F

## 2023-11-07 VITALS
TEMPERATURE: 99 F | SYSTOLIC BLOOD PRESSURE: 143 MMHG | RESPIRATION RATE: 19 BRPM | OXYGEN SATURATION: 97 % | DIASTOLIC BLOOD PRESSURE: 91 MMHG | HEART RATE: 82 BPM

## 2023-11-07 DIAGNOSIS — S01.81XA LACERATION WITHOUT FOREIGN BODY OF OTHER PART OF HEAD, INITIAL ENCOUNTER: ICD-10-CM

## 2023-11-07 DIAGNOSIS — Z98.890 OTHER SPECIFIED POSTPROCEDURAL STATES: Chronic | ICD-10-CM
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
## 2023-11-07 DIAGNOSIS — I10 ESSENTIAL (PRIMARY) HYPERTENSION: ICD-10-CM

## 2023-11-07 DIAGNOSIS — R51.9 HEADACHE, UNSPECIFIED: ICD-10-CM

## 2023-11-07 DIAGNOSIS — Z23 ENCOUNTER FOR IMMUNIZATION: ICD-10-CM

## 2023-11-07 DIAGNOSIS — Z79.82 LONG TERM (CURRENT) USE OF ASPIRIN: ICD-10-CM
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
## 2023-11-07 DIAGNOSIS — M25.531 PAIN IN RIGHT WRIST: ICD-10-CM

## 2023-11-07 DIAGNOSIS — E11.9 TYPE 2 DIABETES MELLITUS WITHOUT COMPLICATIONS: ICD-10-CM

## 2023-11-07 DIAGNOSIS — S52.571A OTHER INTRAARTICULAR FRACTURE OF LOWER END OF RIGHT RADIUS, INITIAL ENCOUNTER FOR CLOSED FRACTURE: ICD-10-CM

## 2023-11-07 PROCEDURE — 74176 CT ABD & PELVIS W/O CONTRAST: CPT | Mod: 26,MA

## 2023-11-07 PROCEDURE — 72125 CT NECK SPINE W/O DYE: CPT | Mod: 26,MA

## 2023-11-07 PROCEDURE — 25600 CLTX DST RDL FX/EPHYS SEP WO: CPT | Mod: 54,RT

## 2023-11-07 PROCEDURE — 73110 X-RAY EXAM OF WRIST: CPT | Mod: 26,RT

## 2023-11-07 PROCEDURE — 73130 X-RAY EXAM OF HAND: CPT | Mod: 26,50

## 2023-11-07 PROCEDURE — 70450 CT HEAD/BRAIN W/O DYE: CPT | Mod: 26,MA

## 2023-11-07 PROCEDURE — 71250 CT THORAX DX C-: CPT | Mod: 26,MA

## 2023-11-07 PROCEDURE — 12013 RPR F/E/E/N/L/M 2.6-5.0 CM: CPT | Mod: 59

## 2023-11-07 PROCEDURE — 99285 EMERGENCY DEPT VISIT HI MDM: CPT | Mod: 25,57

## 2023-11-07 RX ORDER — IBUPROFEN 200 MG
600 TABLET ORAL ONCE
Refills: 0 | Status: COMPLETED | OUTPATIENT
Start: 2023-11-07 | End: 2023-11-07

## 2023-11-07 RX ORDER — ACETAMINOPHEN 500 MG
975 TABLET ORAL EVERY 6 HOURS
Refills: 0 | Status: DISCONTINUED | OUTPATIENT
Start: 2023-11-07 | End: 2023-11-07

## 2023-11-07 RX ORDER — TETANUS TOXOID, REDUCED DIPHTHERIA TOXOID AND ACELLULAR PERTUSSIS VACCINE, ADSORBED 5; 2.5; 8; 8; 2.5 [IU]/.5ML; [IU]/.5ML; UG/.5ML; UG/.5ML; UG/.5ML
0.5 SUSPENSION INTRAMUSCULAR ONCE
Refills: 0 | Status: COMPLETED | OUTPATIENT
Start: 2023-11-07 | End: 2023-11-07

## 2023-11-07 RX ADMIN — Medication 600 MILLIGRAM(S): at 19:13

## 2023-11-07 RX ADMIN — Medication 975 MILLIGRAM(S): at 19:44

## 2023-11-07 RX ADMIN — Medication 975 MILLIGRAM(S): at 19:14

## 2023-11-07 RX ADMIN — Medication 600 MILLIGRAM(S): at 19:43

## 2023-11-07 RX ADMIN — TETANUS TOXOID, REDUCED DIPHTHERIA TOXOID AND ACELLULAR PERTUSSIS VACCINE, ADSORBED 0.5 MILLILITER(S): 5; 2.5; 8; 8; 2.5 SUSPENSION INTRAMUSCULAR at 21:29

## 2023-11-07 NOTE — ED PROCEDURE NOTE - CPROC ED TIME OUT STATEMENT1
“Patient's name, , procedure and correct site were confirmed during the [place] Timeout.”
“Patient's name, , procedure and correct site were confirmed during the [place] Timeout.”
“Patient's name, , procedure and correct site were confirmed during the [place] Timeout.”

## 2023-11-07 NOTE — ED ADULT NURSE NOTE - NSFALLRISKINTERV_ED_ALL_ED

## 2023-11-07 NOTE — ED PROVIDER NOTE - NSFOLLOWUPINSTRUCTIONS_ED_ALL_ED_FT
Fracture    A fracture is a break in one of your bones. This can occur from a variety of injuries, especially traumatic ones. Symptoms include pain, bruising, or swelling. Do not use the injured limb. If a fracture is in one of the bones below your waist, do not put weight on that limb unless instructed to do so by your healthcare provider. Crutches or a cane may have been provided. A splint or cast may have been applied by your health care provider. Make sure to keep it dry and follow up with an orthopedist as instructed.    SEEK IMMEDIATE MEDICAL CARE IF YOU HAVE ANY OF THE FOLLOWING SYMPTOMS: numbness, tingling, increasing pain, or weakness in any part of the injured limb.      Laceration Care, Adult  ImageA laceration is a cut that goes through all layers of the skin. The cut also goes into the tissue that is right under the skin. Some cuts heal on their own. Others need to be closed with stitches (sutures), staples, skin adhesive strips, or wound glue. Taking care of your cut lowers your risk of infection and helps your cut to heal better.    How to take care of your cut  For stitches or staples:     Keep the wound clean and dry.  If you were given a bandage (dressing), you should change it at least one time per day or as told by your doctor. You should also change it if it gets wet or dirty.  Keep the wound completely dry for the first 24 hours or as told by your doctor. After that time, you may take a shower or a bath. However, make sure that the wound is not soaked in water until after the stitches or staples have been removed.  Clean the wound one time each day or as told by your doctor:    Wash the wound with soap and water.  Rinse the wound with water until all of the soap comes off.  Pat the wound dry with a clean towel. Do not rub the wound.    After you clean the wound, put a thin layer of antibiotic ointment on it as told by your doctor. This ointment:    Helps to prevent infection.  Keeps the bandage from sticking to the wound.    Have your stitches or staples removed as told by your doctor.  If your doctor used skin adhesive strips:     Keep the wound clean and dry.  If you were given a bandage, you should change it at least one time per day or as told by your doctor. You should also change it if it gets dirty or wet.  Do not get the skin adhesive strips wet. You can take a shower or a bath, but be careful to keep the wound dry.  If the wound gets wet, pat it dry with a clean towel. Do not rub the wound.  Skin adhesive strips fall off on their own. You can trim the strips as the wound heals. Do not remove any strips that are still stuck to the wound. They will fall off after a while.  If your doctor used wound glue:     Try to keep your wound dry, but you may briefly wet it in the shower or bath. Do not soak the wound in water, such as by swimming.  After you take a shower or a bath, gently pat the wound dry with a clean towel. Do not rub the wound.  Do not do any activities that will make you really sweaty until the skin glue has fallen off on its own.  Do not apply liquid, cream, or ointment medicine to your wound while the skin glue is still on.  If you were given a bandage, you should change it at least one time per day or as told by your doctor. You should also change it if it gets dirty or wet.  If a bandage is placed over the wound, do not let the tape for the bandage touch the skin glue.  Do not pick at the glue. The skin glue usually stays on for 5–10 days. Then, it falls off of the skin.  General Instructions     To help prevent scarring, make sure to cover your wound with sunscreen whenever you are outside after stitches are removed, after adhesive strips are removed, or when wound glue stays in place and the wound is healed. Make sure to wear a sunscreen of at least 30 SPF.  Take over-the-counter and prescription medicines only as told by your doctor.  If you were given antibiotic medicine or ointment, take or apply it as told by your doctor. Do not stop using the antibiotic even if your wound is getting better.  Do not scratch or pick at the wound.  Keep all follow-up visits as told by your doctor. This is important.  Check your wound every day for signs of infection. Watch for:    Redness, swelling, or pain.  Fluid, blood, or pus.    Raise (elevate) the injured area above the level of your heart while you are sitting or lying down, if possible.  Get help if:  You got a tetanus shot and you have any of these problems at the injection site:    Swelling.  Very bad pain.  Redness.  Bleeding.    You have a fever.  A wound that was closed breaks open.  You notice a bad smell coming from your wound or your bandage.  You notice something coming out of the wound, such as wood or glass.  Medicine does not help your pain.  You have more redness, swelling, or pain at the site of your wound.  You have fluid, blood, or pus coming from your wound.  You notice a change in the color of your skin near your wound.  You need to change the bandage often because fluid, blood, or pus is coming from the wound.  You start to have a new rash.  You start to have numbness around the wound.  Get help right away if:  You have very bad swelling around the wound.  Your pain suddenly gets worse and is very bad.  You notice painful lumps near the wound or on skin that is anywhere on your body.  You have a red streak going away from your wound.  The wound is on your hand or foot and you cannot move a finger or toe like you usually can.  The wound is on your hand or foot and you notice that your fingers or toes look pale or bluish.  This information is not intended to replace advice given to you by your health care provider. Make sure you discuss any questions you have with your health care provider.

## 2023-11-07 NOTE — ED PROVIDER NOTE - PROVIDER TOKENS
PROVIDER:[TOKEN:[[id number]],FOLLOWUP:[1-3 Days]],FREE:[LAST:[your pmd in 1-3 days],PHONE:[(   )    -],FAX:[(   )    -]],FREE:[LAST:[[facility] ED in 5 days for suture removal],PHONE:[(   )    -],FAX:[(   )    -]]

## 2023-11-07 NOTE — ED PROVIDER NOTE - PATIENT PORTAL LINK FT
You can access the FollowMyHealth Patient Portal offered by [facility] by registering at the following website: [email or web address]. By joining [employer]’s FollowMyHealth portal, you will also be able to view your health information using other applications (apps) compatible with our system.

## 2023-11-07 NOTE — ED ADULT NURSE NOTE - OBJECTIVE STATEMENT
Pt is AOx4, presents to the ED due to a fall he took 45 mins ago. Pt states he climbed up 4 stairs and then fell backwards and hit his left side of the face on concrete and hit his right arm on the concrete. Laceration noted to left eyebrow, bleeding under control. Left wrist swollen, small abrasions on top of right hand noted. Pt states he felt his right fingers twist when he hit the concrete. Pt denies taking blood thinners.

## 2023-11-07 NOTE — ED PROCEDURE NOTE - PROCEDURE SUPERVISED BY
INR done in clinic. Pt denied any bleeding or bruising. Pt denied any medication or diet changes. Denies any missed/extra doses.Discussed increasing TWD as shown below, as pt hs been below goal range the past 2 weeks. See AAC flowsheet. Onsite billing provider is Dr. Gramajo.Pt ambulated to office with walker device, accomp by granddaughter. RTC in 1 week.LSW    Anticoagulation Summary  As of 10/26/2017    INR goal:   2.0-3.0   TTR:   71.9 % (4.3 y)   Today's INR:   1.4!   Maintenance plan:   5 mg (2 mg x 2.5) on M, Th, Sa; 4 mg (2 mg x 2) all other days   Weekly total:   31 mg   Plan last modified:   Imedla Man RN (10/26/2017)   Next INR check:   11/1/2017   Priority:   Follow-Up - 4 Weeks   Target end date:   Indefinite    Indications    Long term (current) use of anticoagulants [Z79.01]  Atrial fibrillation  unspecified [I48.91]  Atrial fibrillation (CMS/HCC) [I48.91]  Encounter for therapeutic drug monitoring (Resolved) [Z51.81]  Atrial fibrillation  unspecified type (CMS/HCC) [I48.91]             Anticoagulation Episode Summary     INR check location:   [facility]    Preferred lab:       Send INR reminders to:   ANTICOAG MONITORING [facility]    Comments:         Anticoagulation Care Providers     Provider Role Specialty Phone number    Manny Gramajo DO Referring Internal Medicine [phone]        
Santosh Martinez

## 2023-11-07 NOTE — ED ADULT TRIAGE NOTE - CHIEF COMPLAINT QUOTE
Patient reports lost his footing while going upstairs and fell backwards on a 4 steps stairs sustained laceration and abrasion in the left eyebrow/face, reports loss of conscious ness for 2 minutes. h/o HTn and DM, not on blood thinners. Also c/o left wrist/arm pain

## 2023-11-07 NOTE — ED PROVIDER NOTE - OBJECTIVE STATEMENT
63 yr old male with PMHx of DM and HTN not on any medications, presents s/p fall about 30 minutes ago c/o of left-sided head, right wrist, and left hand pain. Patient was going upstairs and on the 4th step tripped, was not able to hold on to hand rail and fell backwards landing on the cement floor. Patient broke the fall with his hands and head. States that he lost consciousness for a few seconds. Denies feeling dizzy before or after the fall, nausea, vomiting, seizures, amnesia. Does not take blood thinners. States that his last tetanus shot was 2-3 years ago. Patient added that he pulled on his left 2nd digit after the fall to relocate it as he believes it dislocated at the proximal interphalangeal joint. No HA/ Dizziness, No fever/chills, No chest pain/palpitations, no SOB/cough/wheeze/stridor, No abdominal pain, no dysuria/frequency/discharge, No neck/back pain, no rash, no changes in neurological status/function. 63 yr old male with PMHx of DM and HTN not on any medications, presents s/p fall about 30 minutes ago c/o of left-sided head, right wrist, and left hand pain. Patient was going upstairs and on the 4th step tripped, was not able to hold on to hand rail and fell backwards landing on the cement floor. Patient broke the fall with his hands and head. ?loc? Denies feeling dizzy before or after the fall, nausea, vomiting, seizures, amnesia.   Does not take blood thinners.   unknown last tdap  Patient added that he pulled on his left 2nd digit after the fall to relocate it as he believes it dislocated at the proximal interphalangeal joint.   No HA/ Dizziness, No fever/chills, No chest pain/palpitations, no SOB/cough/wheeze/stridor, No abdominal pain, no dysuria/frequency/discharge, No neck/back pain, no rash, no changes in neurological status/function.

## 2023-11-07 NOTE — ED PROVIDER NOTE - CLINICAL SUMMARY MEDICAL DECISION MAKING FREE TEXT BOX
63 yr old male with PMHx of DM and HTN not on any medications, presents s/p fall about 30 minutes ago c/o of left-sided head, right wrist, and left hand pain.   hit head (+)LOC. Will order rt wrist xray and b/l hand xray to r/o fractures. CTscans to r/o bleeds. Dispo: Pending results.

## 2023-11-07 NOTE — ED PROVIDER NOTE - CARE PLAN
1 Principal Discharge DX:	Fall  Secondary Diagnosis:	Facial laceration  Secondary Diagnosis:	Distal radius fracture, right

## 2023-11-07 NOTE — ED PROVIDER NOTE - PHYSICAL EXAMINATION
GEN: Pt in NAD, A&O x3. GCS 15  EYES: Sclera white w/o injection, PERRLA, EOMI.  ENT: Head NCAT. Large abrasion over left temperofrontal region with 1 2cm laceration and another 2cm avulsion. No auricular TTP. Normal tympanic reflexes b/L. Neck supple FROM.   RESP: No chest wall tenderness, CTA b/l, no wheezes, rales, or rhonchi.   CARDIAC: RRR, clear distinct S1, S2, no murmurs, gallops, or rubs.   ABD: Abdomen non-distended, soft, non-tender, no rebound or guarding.   VASC: 2+ radial pulses b/l.   NEURO: Normal and equal sensation UE b/l.   MSK: FROM w/o pain UE b/l except for right wrist (deformity and edema) and left 2nd digit with swelling and 1cm lac above PIP. Spine without obvious deformity. UE compartments soft and compressible b/l. GEN: Pt in NAD, A&O x3. GCS 15  EYES: Sclera white w/o injection, PERRLA, EOMI.  ENT: Head NCAT. Large abrasion over left temperofrontal region with 3m laceration and another 2cm avulsion. No auricular TTP. Normal tympanic reflexes b/L. Neck supple FROM.   RESP: No chest wall tenderness, CTA b/l, no wheezes, rales, or rhonchi.   CARDIAC: RRR, clear distinct S1, S2, no murmurs, gallops, or rubs.   ABD: Abdomen non-distended, soft, non-tender, no rebound or guarding.   VASC: 2+ radial pulses b/l.   NEURO: Normal and equal sensation UE b/l.   MSK: FROM w/o pain UE b/l except for right wrist (deformity and edema-- but no open wounds) and left 2nd digit with swelling and abrasions to mcp/pip. Spine without obvious deformity. UE compartments soft and compressible b/l. from b/l le.

## 2023-11-07 NOTE — ED ADULT NURSE NOTE - HISTORY OF COVID-19 VACCINATION
Pt attempted urine sample on bedpan although pt had a BM with urine specimen. Solid, soft stool noted. Call light within reach and instructed pt to call when needing to urinate. V/U.    Yes

## 2023-11-07 NOTE — ED PROVIDER NOTE - CARE PROVIDER_API CALL
Carlos Mcneill  Orthopaedic Surgery  [address] [place] [address]  Phone: [phone]  Fax: [phone]  Follow Up Time: 1-3 Days    your pmd in 1-3 days,   Phone: (   )    -  Fax: (   )    -  Follow Up Time:     [facility] ED in 5 days for suture removal,   Phone: (   )    -  Fax: (   )    -  Follow Up Time:

## 2023-11-13 ENCOUNTER — EMERGENCY (EMERGENCY)
Facility: HOSPITAL | Age: 63
LOS: 0 days | Discharge: HOME | End: 2023-11-13
Payer: MEDICAID

## 2023-11-13 VITALS
SYSTOLIC BLOOD PRESSURE: 134 MMHG | OXYGEN SATURATION: 95 % | HEART RATE: 86 BPM | RESPIRATION RATE: 16 BRPM | HEIGHT: 68 IN | WEIGHT: 199.96 LBS | TEMPERATURE: 98 F | DIASTOLIC BLOOD PRESSURE: 85 MMHG

## 2023-11-13 VITALS
HEART RATE: 81 BPM | OXYGEN SATURATION: 97 % | TEMPERATURE: 98 F | RESPIRATION RATE: 16 BRPM | SYSTOLIC BLOOD PRESSURE: 141 MMHG | DIASTOLIC BLOOD PRESSURE: 79 MMHG

## 2023-11-13 DIAGNOSIS — S01.112D LACERATION WITHOUT FOREIGN BODY OF LEFT EYELID AND PERIOCULAR AREA, SUBSEQUENT ENCOUNTER: ICD-10-CM

## 2023-11-13 DIAGNOSIS — Z98.890 OTHER SPECIFIED POSTPROCEDURAL STATES: Chronic | ICD-10-CM

## 2023-11-13 PROCEDURE — L9995: CPT

## 2023-11-13 RX ORDER — IBUPROFEN 200 MG
600 TABLET ORAL ONCE
Refills: 0 | Status: COMPLETED | OUTPATIENT
Start: 2023-11-13 | End: 2023-11-13

## 2023-11-13 RX ORDER — IBUPROFEN 200 MG
1 TABLET ORAL
Qty: 15 | Refills: 0
Start: 2023-11-13 | End: 2023-11-17

## 2023-11-13 RX ORDER — ACETAMINOPHEN 500 MG
975 TABLET ORAL ONCE
Refills: 0 | Status: COMPLETED | OUTPATIENT
Start: 2023-11-13 | End: 2023-11-13

## 2023-11-13 RX ADMIN — Medication 975 MILLIGRAM(S): at 16:32

## 2023-11-13 RX ADMIN — Medication 600 MILLIGRAM(S): at 16:59

## 2023-11-13 RX ADMIN — Medication 600 MILLIGRAM(S): at 16:31

## 2023-11-13 RX ADMIN — Medication 975 MILLIGRAM(S): at 16:59

## 2023-11-13 NOTE — ED PROVIDER NOTE - PHYSICAL EXAMINATION
GEN: Pt in NAD, A&O x3. GCS 15  EYES: Left eye with erythematous sclera in the lateral aspect by the lateral canthus. PERRLA, EOMI w/o pain. No purulent discharge, bleeding, or photophobia.  ENT: Head NCAT. Healing abrasions on left forehead and cheek bone. healing abrasions crusted over sutures. No purulent discharge noted.   RESP: No chest wall tenderness, CTA b/l, no wheezes, rales, or rhonchi.   CARDIAC: RRR, clear distinct S1, S2, no murmurs, gallops, or rubs.   VASC: Cap refill <2sec UE b/l.  NEURO: Normal and equal sensation UE b/l. 5/5 strength UE b/l.

## 2023-11-13 NOTE — ED ADULT NURSE NOTE - NS ED NURSE DISCH DISPOSITION
4031  Received report from [place], [address] [facility].    0900  Pt denies SI/HI/AVH and states he feels the \"medicines are doing me good\" and that he is ready to go home. 36  Pt reports not being able to get in contact with mother, states the number has been changed. Pt tearful, worried that his mother won't know if he is discharged. 36  Mother called and spoke with pt. Mother told pt that she will call back with new cell phone number. Pt appears much brighter. 1400  Pt socializing in day room waiting for doctor to arrive. Pt offers no concerns or issues at this time. [phone]  Pt upset that rounding doctor will not discharge him. Pt then stated he had \"chest pain\". Pt points to epigastric area when asked where pain is and states it \"just started hurting\". VS taken and were WNL. Pt given Mylanta with good effect. 200  Pt appears labile, one minute tearful and the next socializing with peers. Pt remains safe on 15 minute checks at this time. Discharged

## 2023-11-13 NOTE — ED PROVIDER NOTE - CLINICAL SUMMARY MEDICAL DECISION MAKING FREE TEXT BOX
63 yr old male with DM and HTN here for suture removal. Well appearing healing abrasion that crusted over both lac repairs in left eyebrow w/o oozing, pus, bleeding. Patient with lateral sclera left eye erythema w/o pain nor photophobia or purulent discharge. Normal extraocular movements. Will recommend eye drops. Cap refill, strength, sensation of left arm intact. Pt with ortho appt on Wednesday. Will remove sutures. Dispo home.

## 2023-11-13 NOTE — ED PROVIDER NOTE - NSFOLLOWUPINSTRUCTIONS_ED_ALL_ED_FT
- Please follow up with your Primary Care Doctor in 2-3 days, bring a copy of your results to follow up appointment.     - Please follow up with your scheduled Orthopedic appointment this Wednesday for reevaluation and continued management.     - Please rest, stay hydrated and continue all at home medications as previously prescribed.    - For Pain  Tylenol (acetaminophen) 1000mg every 6-8 hours as needed and/or over the counter Motrin (Ibuprofen/Advil) 600mg every 6 hours as needed, take with food.     - Return to ED for any concern listed below:  fevers, redness, swelling, increased pain purulent discharge or any new or worsened symptoms.

## 2023-11-13 NOTE — ED PROVIDER NOTE - PATIENT PORTAL LINK FT
You can access the FollowMyHealth Patient Portal offered by [facility] by registering at the following website: [email or web address]. By joining [employer]’s FollowMyHealth portal, you will also be able to view your health information using other applications (apps) compatible with our system.

## 2023-11-13 NOTE — ED PROCEDURE NOTE - PROCEDURE ADDITIONAL DETAILS
2 lacerations by left eyebrow. 5 sutures removed from one lac repair and 3 sutures removed from the other. Some scab was removed to get to the sutures w/o complications.

## 2023-11-13 NOTE — ED PROVIDER NOTE - PROGRESS NOTE DETAILS
Supervising Statement (RAUL Scherer): I have personally seen and examined this patient.  I have fully participated in the care of this patient. I have reviewed all pertinent clinical information, including history, physical exam, plan and the ACP Fellow's note and agree except as noted.    Patient had 8 total sutures removed, area clean dry and intact, no signs of infection, surrounding scabbing intact, has ortho follow up this week for radial fracture, requesting ibuprofen rx as he ran out.

## 2023-11-13 NOTE — ED PROVIDER NOTE - OBJECTIVE STATEMENT
63 yr old male with DM and HTN not on any medications, presents for suture removal. Patient had 2 Laceration repairs by his left eyebrow 5 days ago. He was also dx with a right radial fracture that was sugar tonged. Patient has ortho appt in 2 days. Tetanus shot was updated at that time. No fevers, purulent discharge, increased pain, N/V, weakness, or changes in neurological function.

## 2023-11-13 NOTE — ED ADULT NURSE NOTE - NSFALLRISKINTERV_ED_ALL_ED

## 2024-05-31 NOTE — ED ADULT NURSE NOTE - NSSISCREENINGQ2_ED_A_ED
[No Acute Distress] : no acute distress [No Respiratory Distress] : no respiratory distress  [Clear to Auscultation] : lungs were clear to auscultation bilaterally [Normal Rate] : normal rate  [Regular Rhythm] : with a regular rhythm [Normal Affect] : the affect was normal [Normal Mood] : the mood was normal [de-identified] : + Reproducible low back pain, spasm noted, +mild SLR right No

## 2025-06-13 NOTE — PATIENT PROFILE ADULT - BRADEN NUTRITION
[FreeTextEntry1] : MRI C-spine (R, 4/10/25): C5-6, C6-C7, C7-T1 severe right and left neural foraminal stenosis. No cord compression. No cord signal changes.
[FreeTextEntry1] : MRI C-spine (R, 4/10/25): C5-6, C6-C7, C7-T1 severe right and left neural foraminal stenosis. No cord compression. No cord signal changes.
(3) adequate